# Patient Record
Sex: FEMALE | Race: ASIAN
[De-identification: names, ages, dates, MRNs, and addresses within clinical notes are randomized per-mention and may not be internally consistent; named-entity substitution may affect disease eponyms.]

---

## 2019-08-04 ENCOUNTER — HOSPITAL ENCOUNTER (INPATIENT)
Dept: HOSPITAL 72 - EMR | Age: 49
LOS: 3 days | Discharge: HOME | DRG: 192 | End: 2019-08-07
Payer: SELF-PAY

## 2019-08-04 VITALS — DIASTOLIC BLOOD PRESSURE: 72 MMHG | SYSTOLIC BLOOD PRESSURE: 126 MMHG

## 2019-08-04 VITALS
DIASTOLIC BLOOD PRESSURE: 97 MMHG | HEIGHT: 65 IN | WEIGHT: 140.06 LBS | BODY MASS INDEX: 23.34 KG/M2 | SYSTOLIC BLOOD PRESSURE: 160 MMHG

## 2019-08-04 VITALS — SYSTOLIC BLOOD PRESSURE: 138 MMHG | DIASTOLIC BLOOD PRESSURE: 86 MMHG

## 2019-08-04 VITALS — DIASTOLIC BLOOD PRESSURE: 79 MMHG | SYSTOLIC BLOOD PRESSURE: 138 MMHG

## 2019-08-04 VITALS — DIASTOLIC BLOOD PRESSURE: 76 MMHG | SYSTOLIC BLOOD PRESSURE: 115 MMHG

## 2019-08-04 VITALS — SYSTOLIC BLOOD PRESSURE: 120 MMHG | DIASTOLIC BLOOD PRESSURE: 78 MMHG

## 2019-08-04 DIAGNOSIS — R06.03: ICD-10-CM

## 2019-08-04 DIAGNOSIS — E11.9: ICD-10-CM

## 2019-08-04 DIAGNOSIS — J44.1: Primary | ICD-10-CM

## 2019-08-04 DIAGNOSIS — F17.200: ICD-10-CM

## 2019-08-04 LAB
ADD MANUAL DIFF: NO
ALBUMIN SERPL-MCNC: 4 G/DL (ref 3.4–5)
ALBUMIN/GLOB SERPL: 1.1 {RATIO} (ref 1–2.7)
ALP SERPL-CCNC: 80 U/L (ref 46–116)
ALT SERPL-CCNC: 23 U/L (ref 12–78)
ANION GAP SERPL CALC-SCNC: 12 MMOL/L (ref 5–15)
APPEARANCE UR: CLEAR
APTT PPP: (no result) S
AST SERPL-CCNC: 22 U/L (ref 15–37)
BASOPHILS NFR BLD AUTO: 0.3 % (ref 0–2)
BILIRUB SERPL-MCNC: 0.5 MG/DL (ref 0.2–1)
BUN SERPL-MCNC: 9 MG/DL (ref 7–18)
CALCIUM SERPL-MCNC: 8.4 MG/DL (ref 8.5–10.1)
CHLORIDE SERPL-SCNC: 104 MMOL/L (ref 98–107)
CO2 SERPL-SCNC: 23 MMOL/L (ref 21–32)
CREAT SERPL-MCNC: 0.5 MG/DL (ref 0.55–1.3)
EOSINOPHIL NFR BLD AUTO: 1.3 % (ref 0–3)
ERYTHROCYTE [DISTWIDTH] IN BLOOD BY AUTOMATED COUNT: 11.3 % (ref 11.6–14.8)
GLOBULIN SER-MCNC: 3.7 G/DL
GLUCOSE UR STRIP-MCNC: (no result) MG/DL
HCT VFR BLD CALC: 46.1 % (ref 37–47)
HGB BLD-MCNC: 15.7 G/DL (ref 12–16)
KETONES UR QL STRIP: (no result)
LEUKOCYTE ESTERASE UR QL STRIP: NEGATIVE
LYMPHOCYTES NFR BLD AUTO: 12.4 % (ref 20–45)
MCV RBC AUTO: 89 FL (ref 80–99)
MONOCYTES NFR BLD AUTO: 4.3 % (ref 1–10)
NEUTROPHILS NFR BLD AUTO: 81.5 % (ref 45–75)
NITRITE UR QL STRIP: NEGATIVE
PH UR STRIP: 6 [PH] (ref 4.5–8)
PLATELET # BLD: 277 K/UL (ref 150–450)
POTASSIUM SERPL-SCNC: 3.6 MMOL/L (ref 3.5–5.1)
PROT UR QL STRIP: (no result)
RBC # BLD AUTO: 5.17 M/UL (ref 4.2–5.4)
SODIUM SERPL-SCNC: 139 MMOL/L (ref 136–145)
SP GR UR STRIP: 1.02 (ref 1–1.03)
UROBILINOGEN UR-MCNC: NORMAL MG/DL (ref 0–1)
WBC # BLD AUTO: 15.2 K/UL (ref 4.8–10.8)

## 2019-08-04 PROCEDURE — 96375 TX/PRO/DX INJ NEW DRUG ADDON: CPT

## 2019-08-04 PROCEDURE — 87040 BLOOD CULTURE FOR BACTERIA: CPT

## 2019-08-04 PROCEDURE — 36600 WITHDRAWAL OF ARTERIAL BLOOD: CPT

## 2019-08-04 PROCEDURE — 5A09357 ASSISTANCE WITH RESPIRATORY VENTILATION, LESS THAN 24 CONSECUTIVE HOURS, CONTINUOUS POSITIVE AIRWAY PRESSURE: ICD-10-PCS

## 2019-08-04 PROCEDURE — 94640 AIRWAY INHALATION TREATMENT: CPT

## 2019-08-04 PROCEDURE — 84484 ASSAY OF TROPONIN QUANT: CPT

## 2019-08-04 PROCEDURE — 82962 GLUCOSE BLOOD TEST: CPT

## 2019-08-04 PROCEDURE — 85651 RBC SED RATE NONAUTOMATED: CPT

## 2019-08-04 PROCEDURE — 80053 COMPREHEN METABOLIC PANEL: CPT

## 2019-08-04 PROCEDURE — 80048 BASIC METABOLIC PNL TOTAL CA: CPT

## 2019-08-04 PROCEDURE — 85007 BL SMEAR W/DIFF WBC COUNT: CPT

## 2019-08-04 PROCEDURE — 97803 MED NUTRITION INDIV SUBSEQ: CPT

## 2019-08-04 PROCEDURE — 83735 ASSAY OF MAGNESIUM: CPT

## 2019-08-04 PROCEDURE — 85025 COMPLETE CBC W/AUTO DIFF WBC: CPT

## 2019-08-04 PROCEDURE — 96368 THER/DIAG CONCURRENT INF: CPT

## 2019-08-04 PROCEDURE — 93005 ELECTROCARDIOGRAM TRACING: CPT

## 2019-08-04 PROCEDURE — 96365 THER/PROPH/DIAG IV INF INIT: CPT

## 2019-08-04 PROCEDURE — 71045 X-RAY EXAM CHEST 1 VIEW: CPT

## 2019-08-04 PROCEDURE — 99291 CRITICAL CARE FIRST HOUR: CPT

## 2019-08-04 PROCEDURE — 86140 C-REACTIVE PROTEIN: CPT

## 2019-08-04 PROCEDURE — 81003 URINALYSIS AUTO W/O SCOPE: CPT

## 2019-08-04 PROCEDURE — 36415 COLL VENOUS BLD VENIPUNCTURE: CPT

## 2019-08-04 PROCEDURE — 82803 BLOOD GASES ANY COMBINATION: CPT

## 2019-08-04 PROCEDURE — 94664 DEMO&/EVAL PT USE INHALER: CPT

## 2019-08-04 PROCEDURE — 84100 ASSAY OF PHOSPHORUS: CPT

## 2019-08-04 PROCEDURE — 83880 ASSAY OF NATRIURETIC PEPTIDE: CPT

## 2019-08-04 PROCEDURE — 94660 CPAP INITIATION&MGMT: CPT

## 2019-08-04 RX ADMIN — IPRATROPIUM BROMIDE AND ALBUTEROL SULFATE SCH ML: .5; 3 SOLUTION RESPIRATORY (INHALATION) at 19:11

## 2019-08-04 RX ADMIN — Medication SCH MCG: at 06:01

## 2019-08-04 RX ADMIN — ALBUTEROL SULFATE SCH MG: 2.5 SOLUTION RESPIRATORY (INHALATION) at 06:50

## 2019-08-04 RX ADMIN — ALBUTEROL SULFATE SCH MG: 2.5 SOLUTION RESPIRATORY (INHALATION) at 05:53

## 2019-08-04 RX ADMIN — METHYLPREDNISOLONE SODIUM SUCCINATE SCH MG: 125 INJECTION, POWDER, FOR SOLUTION INTRAMUSCULAR; INTRAVENOUS at 18:09

## 2019-08-04 RX ADMIN — IPRATROPIUM BROMIDE AND ALBUTEROL SULFATE SCH ML: .5; 3 SOLUTION RESPIRATORY (INHALATION) at 10:42

## 2019-08-04 RX ADMIN — Medication SCH MCG: at 05:53

## 2019-08-04 RX ADMIN — Medication SCH MCG: at 06:49

## 2019-08-04 RX ADMIN — IPRATROPIUM BROMIDE AND ALBUTEROL SULFATE SCH ML: .5; 3 SOLUTION RESPIRATORY (INHALATION) at 22:56

## 2019-08-04 RX ADMIN — ALBUTEROL SULFATE SCH MG: 2.5 SOLUTION RESPIRATORY (INHALATION) at 06:21

## 2019-08-04 RX ADMIN — ALBUTEROL SULFATE SCH MG: 2.5 SOLUTION RESPIRATORY (INHALATION) at 06:48

## 2019-08-04 RX ADMIN — ALBUTEROL SULFATE SCH MG: 2.5 SOLUTION RESPIRATORY (INHALATION) at 06:01

## 2019-08-04 RX ADMIN — ALBUTEROL SULFATE SCH MG: 2.5 SOLUTION RESPIRATORY (INHALATION) at 06:49

## 2019-08-04 RX ADMIN — Medication SCH MCG: at 06:50

## 2019-08-04 RX ADMIN — Medication SCH MCG: at 06:21

## 2019-08-04 RX ADMIN — IPRATROPIUM BROMIDE AND ALBUTEROL SULFATE SCH ML: .5; 3 SOLUTION RESPIRATORY (INHALATION) at 15:38

## 2019-08-04 NOTE — NUR
CASE MANAGEMENT: REVIEW



49Y/F PRESENTED TO ED FROM HOME 



CC: RESP DISTRESS 





SI: COPD EXACERBATION 

T 97.7  RR 30 /97 % BIPAP FIO2 100

WBC 15.2 

ABG: PH 7.267 PO2 <45.3 HCO3 19.6 O2 SAT 60.1 





IS: NS IVF BOLUS X1

ATROVENT HHN X1

ALBUTEROL HHN X1

SOLU MEDROL IV X1

AZITHROMYCIN IV X1

CEFTRIAXONE IV X1 





***PATIENT ADMITTED TO STEP DOWN UNIT 08/04/2019***

DCP: PATIENT IS FROM HOME

## 2019-08-04 NOTE — DIAGNOSTIC IMAGING REPORT
EXAM:

  XR Chest, 1 View

 

CLINICAL HISTORY:

  SOB

 

TECHNIQUE:

  Frontal view of the chest.

 

COMPARISON:

  No relevant prior studies available.

 

FINDINGS:

  Lungs:  Unremarkable.  No consolidation.

  Pleural space:  Unremarkable.  No pneumothorax.

  Heart:  Unremarkable.  No cardiomegaly.

  Mediastinum:  Unremarkable.

  Bones/joints:  Unremarkable.

  Upper abdomen:  Gassy stomach or bowel in the upper abdomen.

 

IMPRESSION:     

1.  No acute cardiothoracic process.

2.  Gassy stomach or bowel in the upper abdomen.

## 2019-08-04 NOTE — EMERGENCY ROOM REPORT
History of Present Illness


General


Chief Complaint:  Dyspnea/Respdistress


Source:  Patient, EMS





Present Illness


HPI


49-year-old female history of asthma history limited secondary to patient's 

medical condition, collateral history provided by EMS, patient presents with 

acute shortness of breath that started just prior to arrival she has a history 

of asthma, unknown aggravating or alleviating factors, patient takes albuterol 

at home, unknown if she was using at home, severity is severe,


Allergies:  


Coded Allergies:  


     No Known Allergies (Unverified , 8/4/19)





Patient History


Past Medical History:  see triage record


Reviewed Nursing Documentation:  PMH: Agreed; PSxH: Agreed





Nursing Documentation-PMH


Past Medical History:  No History, Except For


Hx Asthma:  Yes





Review of Systems


All Other Systems:  limited - acute shortness of breath





Physical Exam





Vital Signs








  Date Time  Temp Pulse Resp B/P (MAP) Pulse Ox O2 Delivery O2 Flow Rate FiO2


 


8/4/19 05:28 97.7 97 30 160/72 (101) 98 Room Air  








Sp02 EP Interpretation:  reviewed, normal


General Appearance:  alert, moderate distress


Head:  normocephalic, atraumatic


Eyes:  bilateral eye PERRL, bilateral eye EOMI


ENT:  uvula midline, moist mucus membranes


Neck:  supple, thyroid normal, supple/symm/no masses


Respiratory:  respiratory distress, accessory muscle use, wheezing - severe


Cardiovascular #1:  normal peripheral pulses, regular rate, rhythm, no edema, 

no gallop, no murmur


Gastrointestinal:  non tender, soft, no guarding, no rebound


Musculoskeletal:  normal inspection


Neurologic:  alert, oriented x3


Psychiatric:  mood/affect normal


Skin:  no rash, warm/dry





Procedures


Critical Care Time


Critical Care Time


Given the critical condition in which the patient arrived, the patient was 

immediately assessed by myself and the nurse, and cardiac monitoring initiated 

due to the potential for rapid decompensation of the patient's clinical 

condition. During the course of the patient's stay, I spent a considerable 

amount of time at the bedside performing serial re-evaluations of the patient's 

hemodynamic and clinical status because of the recognized potential threat to 

life or limb in this condition. I then had a chance to review not only all of 

the available current laboratory and radiographic studies obtained today, but I 

also reviewed old records available to me at the time. Additionally, any 

ancillary information available including paramedic records were reviewed. 

Sequential vital signs were obtained. 





Critical Care time of 32 minutes was performed exclusive of billable procedures.





Patient with acute respiratory distress.





Medical Decision Making


Diagnostic Impression:  


 Primary Impression:  


 Respiratory distress


 Additional Impression:  


 COPD with exacerbation


ER Course


49-year-old female history of COPD, history of smoking presents with acute 

shortness of breath, patient unable to speak full sentences using accessory 

muscles,


BiPAP immediately started for acute respiratory distress


Duo nebs, steroids, fluids


Ceftriaxone azithromycin started for possible pneumonia given sputum production 

over the past day


Reevaluation 6:13 AM, patient slowly improving with BiPAP, will reassess and ABG


Reeval 6:35 AM, patient more comfortable


Patient admitted to Dr. Carey





Laboratory Tests








Test


  8/4/19


05:25 8/4/19


06:24 8/4/19


06:40


 


White Blood Count


  15.2 K/UL


(4.8-10.8)  H 


  


 


 


Red Blood Count


  5.17 M/UL


(4.20-5.40) 


  


 


 


Hemoglobin


  15.7 G/DL


(12.0-16.0) 


  


 


 


Hematocrit


  46.1 %


(37.0-47.0) 


  


 


 


Mean Corpuscular Volume 89 FL (80-99)    


 


Mean Corpuscular Hemoglobin


  30.3 PG


(27.0-31.0) 


  


 


 


Mean Corpuscular Hemoglobin


Concent 34.0 G/DL


(32.0-36.0) 


  


 


 


Red Cell Distribution Width


  11.3 %


(11.6-14.8)  L 


  


 


 


Platelet Count


  277 K/UL


(150-450) 


  


 


 


Mean Platelet Volume


  6.3 FL


(6.5-10.1)  L 


  


 


 


Neutrophils (%) (Auto)


  81.5 %


(45.0-75.0)  H 


  


 


 


Lymphocytes (%) (Auto)


  12.4 %


(20.0-45.0)  L 


  


 


 


Monocytes (%) (Auto)


  4.3 %


(1.0-10.0) 


  


 


 


Eosinophils (%) (Auto)


  1.3 %


(0.0-3.0) 


  


 


 


Basophils (%) (Auto)


  0.3 %


(0.0-2.0) 


  


 


 


Sodium Level


  139 MMOL/L


(136-145) 


  


 


 


Potassium Level


  3.6 MMOL/L


(3.5-5.1) 


  


 


 


Chloride Level


  104 MMOL/L


() 


  


 


 


Carbon Dioxide Level


  23 MMOL/L


(21-32) 


  


 


 


Anion Gap


  12 mmol/L


(5-15) 


  


 


 


Blood Urea Nitrogen


  9 mg/dL (7-18)


  


  


 


 


Creatinine


  0.5 MG/DL


(0.55-1.30)  L 


  


 


 


Estimate Glomerular


Filtration Rate > 60 mL/min


(>60) 


  


 


 


Glucose Level


  147 MG/DL


()  H 


  


 


 


Calcium Level


  8.4 MG/DL


(8.5-10.1)  L 


  


 


 


Total Bilirubin


  0.5 MG/DL


(0.2-1.0) 


  


 


 


Aspartate Amino Transferase


(AST) 22 U/L (15-37)


  


  


 


 


Alanine Aminotransferase (ALT)


  23 U/L (12-78)


  


  


 


 


Alkaline Phosphatase


  80 U/L


() 


  


 


 


Troponin I


  0.000 ng/mL


(0.000-0.056) 


  


 


 


Pro-B-Type Natriuretic Peptide


  72 pg/mL


(0-125) 


  


 


 


Total Protein


  7.7 G/DL


(6.4-8.2) 


  


 


 


Albumin


  4.0 G/DL


(3.4-5.0) 


  


 


 


Globulin 3.7 g/dL    


 


Albumin/Globulin Ratio 1.1 (1.0-2.7)    


 


Arterial Blood pH


  


  7.267


(7.350-7.450) 


 


 


Arterial Blood Partial


Pressure CO2 


  44.0 mmHg


(35.0-45.0) 


 


 


Arterial Blood Partial


Pressure O2 


  < 45.3 mmHg


(75.0-100.0) 


 


 


Arterial Blood HCO3


  


  19.6 mmol/L


(22.0-26.0)  L 


 


 


Arterial Blood Oxygen


Saturation 


  60.1 %


()  *L 


 


 


Arterial Blood Base Excess  -7.1 (-2-2)  L 


 


Mason Test  Positive   


 


Urine Color   Pale yellow  


 


Urine Appearance   Clear  


 


Urine pH   6 (4.5-8.0)  


 


Urine Specific Gravity


  


  


  1.020


(1.005-1.035)


 


Urine Protein


  


  


  4+ (NEGATIVE)


H


 


Urine Glucose (UA)


  


  


  1+ (NEGATIVE)


H


 


Urine Ketones


  


  


  3+ (NEGATIVE)


H


 


Urine Blood


  


  


  4+ (NEGATIVE)


H


 


Urine Nitrite


  


  


  Negative


(NEGATIVE)


 


Urine Bilirubin


  


  


  Negative


(NEGATIVE)


 


Urine Urobilinogen


  


  


  Normal MG/DL


(0.0-1.0)


 


Urine Leukocyte Esterase


  


  


  Negative


(NEGATIVE)


 


Urine RBC


  


  


  10-15 /HPF (0


- 2)  H


 


Urine WBC


  


  


  0-2 /HPF (0 -


2)


 


Urine Squamous Epithelial


Cells 


  


  Moderate /LPF


(NONE/OCC)  H


 


Urine Bacteria


  


  


  Few /HPF


(NONE)








EKG Diagnostic Results


EKG Time:  05:50


EP Interpretation:  NSR rate 86 normal axis, no acute st elevations, premature 

atrial complex


Rate:  normal


Rhythm:  NSR


ST Segments:  no acute changes


ASA given to the pt in ED:  No





Rhythm Strip Diag. Results


Rhythm Strip Time:  05:55


EP Interpretation:  yes


Rate:  84


Rhythm:  NSR, other - premature atrial complexes noted





Chest X-Ray Diagnostic Results


Chest X-Ray Diagnostic Results :  


   Chest X-Ray Ordered:  Yes


   # of Views/Limited/Complete:  1 View


   Indication:  Shortness of Breath


   EP Interpretation:  Yes


   Interpretation:  no consolidation, no effusion, no pneumothorax, no acute 

cardiopulmonary disease


   Impression:  No acute disease


   Electronically Signed by:  Pedro Luis Valencia MD





Last Vital Signs








  Date Time  Temp Pulse Resp B/P (MAP) Pulse Ox O2 Delivery O2 Flow Rate FiO2


 


8/4/19 05:28 97.7 97 30 160/72 (101) 98 Room Air  








Disposition:  ADMITTED AS INPATIENT


Condition:  Stable











Pedro Luis Valencia MD Aug 4, 2019 05:42

## 2019-08-04 NOTE — NUR
ED Nurse Note:





report given to Kathia ROMERO, endorsed all plan of care to Kathia ROMERO.

patient is being transferred with all of her belongings in stable condition,

## 2019-08-04 NOTE — NUR
NURSE NOTES:

Received patient from Mary Ellen ROMERO. Patient is alert and oriented X4 with no signs of distress. 
Pt is well groomed, and clean. Pt in bed and resting with IV patent and intact. Bed at its 
lowest position, call light in reach and X 3 bed rails are up.

## 2019-08-04 NOTE — CONSULTATION
History of Present Illness


General


Date patient seen:  Aug 4, 2019


Chief Complaint:  Dyspnea/Respdistress





Present Illness


HPI


48 y/o F with hx of asthma/COPD, tobacco abuse, diabetes presented to ED on 8/4 

with acute SOB.


Allergies:  


Coded Allergies:  


     No Known Allergies (Unverified , 8/4/19)





Medication History


Miscellaneous Medications


Unable to Obtain Medications (Unable To Obtain Meds), (Reported)





Patient History


Healthcare decision maker


self


Resuscitation status


Full Code


Advanced Directive on File





Patient History Narrative





Pmhx: as above





Shx:   Positive smoke.  No alcohol.  No intravenous drug abuse.


 


Fhx: non contributory





Review of Systems


All Other Systems:  negative except mentioned in HPI





Physical Exam


Physical Exam Narrative








GENERAL:  Sleepy in bed, not responding to questions.


CARDIOVASCULAR:  No murmur.


LUNGS:  Poor air exchange.


ABDOMEN:  Bowel sounds distant.


EXTREMITIES:  No cyanosis or edema.


NEUROLOGIC:  The patient is flaccid in bed, does not want to follow


directions.





Last 24 Hour Vital Signs








  Date Time  Temp Pulse Resp B/P (MAP) Pulse Ox O2 Delivery O2 Flow Rate FiO2


 


8/4/19 16:00      Nasal Cannula 2.0 


 


8/4/19 16:00 97.9 88 23 115/76 (89) 98   


 


8/4/19 15:30  88      


 


8/4/19 15:29  82 20  99 Nasal Cannula 2.0 28


 


8/4/19 15:15  78 18  98 Nasal Cannula 2.0 28


 


8/4/19 12:00 97.9 87 20 138/79 (98) 97   


 


8/4/19 12:00      Nasal Cannula 2.0 


 


8/4/19 11:47  82      


 


8/4/19 11:11      Bi-pap  


 


8/4/19 11:01       2.0 


 


8/4/19 11:00        21


 


8/4/19 10:49  77 20  100 Bi-Pap  21


 


8/4/19 10:42  80 20  100 Facial  21


 


8/4/19 10:40  80 20  100 Bi-Pap  21


 


8/4/19 09:20  80 23  100 Facial  25


 


8/4/19 09:00 97.6 79 25 126/72 (90) 99   


 


8/4/19 09:00      Bi-pap  


 


8/4/19 08:38  81      


 


8/4/19 08:30 97.7 82 17 120/78 100 Bi-pap  25


 


8/4/19 07:20 97.7 82 17 120/78 100 Bi-pap  


 


8/4/19 06:52  88 22  100 Bi-Pap  25


 


8/4/19 06:50  88 22  100 Facial  25


 


8/4/19 06:21  81 19  99 Bi-Pap  25


 


8/4/19 06:01  88 24  99 Bi-Pap  40


 


8/4/19 05:56  86 25  100 Facial  40


 


8/4/19 05:55  96 28  99 Bi-Pap  40


 


8/4/19 05:54  96 28  96 Bi-Pap  40


 


8/4/19 05:30  105 30   Bi-pap  100


 


8/4/19 05:30 97.7 105 30 160/97 100 Room Air  


 


8/4/19 05:28 97.7 97 30 160/72 (101) 98 Room Air  

















Intake and Output  


 


 8/3/19 8/4/19





 19:00 07:00


 


Intake Total  830 ml


 


Output Total  0 ml


 


Balance  830 ml


 


  


 


Intake IV Total  830 ml


 


Output Urine Total  0 ml











Laboratory Tests








Test


  8/4/19


05:25 8/4/19


06:24 8/4/19


06:40 8/4/19


12:39


 


White Blood Count


  15.2 K/UL


(4.8-10.8)  H 


  


  


 


 


Red Blood Count


  5.17 M/UL


(4.20-5.40) 


  


  


 


 


Hemoglobin


  15.7 G/DL


(12.0-16.0) 


  


  


 


 


Hematocrit


  46.1 %


(37.0-47.0) 


  


  


 


 


Mean Corpuscular Volume 89 FL (80-99)     


 


Mean Corpuscular Hemoglobin


  30.3 PG


(27.0-31.0) 


  


  


 


 


Mean Corpuscular Hemoglobin


Concent 34.0 G/DL


(32.0-36.0) 


  


  


 


 


Red Cell Distribution Width


  11.3 %


(11.6-14.8)  L 


  


  


 


 


Platelet Count


  277 K/UL


(150-450) 


  


  


 


 


Mean Platelet Volume


  6.3 FL


(6.5-10.1)  L 


  


  


 


 


Neutrophils (%) (Auto)


  81.5 %


(45.0-75.0)  H 


  


  


 


 


Lymphocytes (%) (Auto)


  12.4 %


(20.0-45.0)  L 


  


  


 


 


Monocytes (%) (Auto)


  4.3 %


(1.0-10.0) 


  


  


 


 


Eosinophils (%) (Auto)


  1.3 %


(0.0-3.0) 


  


  


 


 


Basophils (%) (Auto)


  0.3 %


(0.0-2.0) 


  


  


 


 


Sodium Level


  139 MMOL/L


(136-145) 


  


  


 


 


Potassium Level


  3.6 MMOL/L


(3.5-5.1) 


  


  


 


 


Chloride Level


  104 MMOL/L


() 


  


  


 


 


Carbon Dioxide Level


  23 MMOL/L


(21-32) 


  


  


 


 


Anion Gap


  12 mmol/L


(5-15) 


  


  


 


 


Blood Urea Nitrogen


  9 mg/dL (7-18)


  


  


  


 


 


Creatinine


  0.5 MG/DL


(0.55-1.30)  L 


  


  


 


 


Estimat Glomerular Filtration


Rate > 60 mL/min


(>60) 


  


  


 


 


Glucose Level


  147 MG/DL


()  H 


  


  


 


 


Calcium Level


  8.4 MG/DL


(8.5-10.1)  L 


  


  


 


 


Total Bilirubin


  0.5 MG/DL


(0.2-1.0) 


  


  


 


 


Aspartate Amino Transf


(AST/SGOT) 22 U/L (15-37)


  


  


  


 


 


Alanine Aminotransferase


(ALT/SGPT) 23 U/L (12-78)


  


  


  


 


 


Alkaline Phosphatase


  80 U/L


() 


  


  


 


 


Troponin I


  0.000 ng/mL


(0.000-0.056) 


  


  


 


 


Pro-B-Type Natriuretic Peptide


  72 pg/mL


(0-125) 


  


  


 


 


Total Protein


  7.7 G/DL


(6.4-8.2) 


  


  


 


 


Albumin


  4.0 G/DL


(3.4-5.0) 


  


  


 


 


Globulin 3.7 g/dL     


 


Albumin/Globulin Ratio 1.1 (1.0-2.7)     


 


Arterial Blood pH


  


  7.267


(7.350-7.450) 


  7.342


(7.350-7.450)


 


Arterial Blood Partial


Pressure CO2 


  44.0 mmHg


(35.0-45.0) 


  39.1 mmHg


(35.0-45.0)


 


Arterial Blood Partial


Pressure O2 


  < 45.3 mmHg


(75.0-100.0) 


  47.1 mmHg


(75.0-100.0)


 


Arterial Blood HCO3


  


  19.6 mmol/L


(22.0-26.0)  L 


  20.8 mmol/L


(22.0-26.0)  L


 


Arterial Blood Oxygen


Saturation 


  60.1 %


()  *L 


  83.2 %


()  *L


 


Arterial Blood Base Excess  -7.1 (-2-2)  L  -4.5 (-2-2)  L


 


Mason Test  Positive    Positive  


 


Urine Color   Pale yellow   


 


Urine Appearance   Clear   


 


Urine pH   6 (4.5-8.0)   


 


Urine Specific Gravity


  


  


  1.020


(1.005-1.035) 


 


 


Urine Protein


  


  


  4+ (NEGATIVE)


H 


 


 


Urine Glucose (UA)


  


  


  1+ (NEGATIVE)


H 


 


 


Urine Ketones


  


  


  3+ (NEGATIVE)


H 


 


 


Urine Blood


  


  


  4+ (NEGATIVE)


H 


 


 


Urine Nitrite


  


  


  Negative


(NEGATIVE) 


 


 


Urine Bilirubin


  


  


  Negative


(NEGATIVE) 


 


 


Urine Urobilinogen


  


  


  Normal MG/DL


(0.0-1.0) 


 


 


Urine Leukocyte Esterase


  


  


  Negative


(NEGATIVE) 


 


 


Urine RBC


  


  


  10-15 /HPF (0


- 2)  H 


 


 


Urine WBC


  


  


  0-2 /HPF (0 -


2) 


 


 


Urine Squamous Epithelial


Cells 


  


  Moderate /LPF


(NONE/OCC)  H 


 


 


Urine Bacteria


  


  


  Few /HPF


(NONE) 


 








Height (Feet):  5


Height (Inches):  5.00


Weight (Pounds):  125


Medications





Current Medications








 Medications


  (Trade)  Dose


 Ordered  Sig/Alistair


 Route


 PRN Reason  Start Time


 Stop Time Status Last Admin


Dose Admin


 


 Albuterol/


 Ipratropium


  (Albuterol/


 Ipratropium)  3 ml  Q4H  PRN


 HHN


 Shortness of Breath  8/4/19 17:30


 8/9/19 17:29   


 


 


 Albuterol/


 Ipratropium


  (Albuterol/


 Ipratropium)  3 ml  Q4HRT


 HHN


   8/4/19 11:00


 8/9/19 10:59  8/4/19 15:38


 


 


 Dextrose


  (Dextrose 50%)    STAT  PRN


 IV


 Hypoglycemia  8/4/19 17:30


 9/3/19 17:29   


 


 


 Dextrose/Sodium


 Chloride  1,000 ml @ 


 50 mls/hr  Q20H


 IV


   8/4/19 18:00


 9/3/19 17:59   


 


 


 Insulin Aspart


  (NovoLOG)    BEFORE MEALS AND  HS


 SUBQ


   8/4/19 21:00


 9/3/19 20:59   


 


 


 Lorazepam


  (Ativan 2mg/ml


 1ml)  2 mg  Q4H  PRN


 IV


 For Anxiety  8/4/19 17:30


 8/11/19 17:29   


 


 


 Methylprednisolone


 Sodium Succinate


  (Solu-MEDROL)  60 mg  EVERY 6  HOURS


 IV


   8/4/19 18:00


 9/3/19 17:59   


 


 


 Morphine Sulfate


  (Morphine


 Sulfate)  2 mg  Q4H  PRN


 IVP


 Moderate Pain (Pain Scale 4-6)  8/4/19 17:45


 8/11/19 17:29   


 


 


 Morphine Sulfate


  (Morphine


 Sulfate)  4 mg  Q4H  PRN


 IVP


 Severe Pain (Pain Scale 7-10)  8/4/19 17:30


 8/11/19 17:29   


 


 


 Nitroglycerin


  (Ntg)  0.4 mg  Q5M X 3 DOSES PRN


 SL


 Prn Chest Pain  8/4/19 17:30


 9/3/19 17:29   


 


 


 Ondansetron HCl


  (Zofran)  4 mg  Q6H  PRN


 IVP


 Nausea & Vomiting  8/4/19 17:30


 9/3/19 17:29   


 


 


 Pantoprazole


  (Protonix)  40 mg  DAILY


 IV


   8/5/19 09:00


 9/4/19 08:59   


 


 


 Piperacillin Sod/


 Tazobactam Sod


 3.375 gm/Dextrose  110 ml @ 


 27.5 mls/hr  EVERY 8  HOURS


 IVPB


   8/4/19 22:00


 8/11/19 21:59   


 











Assessment/Plan


Assessment/Plan:





Abx:


Ceftriaxone x1 8/4


Azithromycin x1 8/4





Assessment:


COPD exacerbation


  -CXR: 1.  No acute cardiothoracic process. Gassy stomach or bowel in the 

upper abdomen.


 


Afebrile


Mild leukocytosis


  -u/a no pyuria, +hematuria





asthma/COPD


tobacco abuse


diabetes





Plan:


-Start Levaquin for COPD exacerbation


-f/u cx


-Monitor CBC/BMP, temperatures


-respiratory supportive treatment


-aspiration precautions





Thank you for this consultation. Will continue to follow along with you.





Discussed with Leida Cha M.D. Aug 4, 2019 17:50

## 2019-08-04 NOTE — NUR
NURSE NOTES:

Received bedside report from NICK Bae.Patient stable,in a bed,A&O x4,no c/o pain,no 
respiratory distress noted at this moment,tolerated N/C with 2 L/min well,BS active in all 
quadrants,IV asymptomatic,intact on R AC G 20 running with D 5 1/2 NS @ 50 ml/hr and L wrist 
G 20 SL, bed secured in a low safety position,call light within a reach.Will continue to 
monitor and follow POC.

## 2019-08-04 NOTE — NUR
ED Nurse Note:



pt mary ra 26 per ems pt was on her driveway showing signs of difficulty 
breathing. on route pt was given albuterol. ermd at bedside, rt at bedside. pt 
placed on bipap. pt placed on cardiac monitor, placed in gown, bed at lowest 
position x 2 siderails

## 2019-08-04 NOTE — NUR
NURSE NOTES:



Placed patient on nasal cannula at 2LPM. Will monitor her oxygen level. Current oxygen 
saturation is 98%.

## 2019-08-04 NOTE — NUR
NURSE NOTES:



Received patient fro ED. Transferred to cardiac monitor bed with 1 staff assist, patient is 
able to move with minimal assistance. Patient is currently on bipap. Call light within 
reach. Denies any pain at this time. Belonging list checked with ED nurse. Cellphone at 
bedside, with no . Patient is alert, able to make needs known. Patient stated that 
she's using her brother's albuterol inhaler at home. Will admit to SDU standard level of 
care. Skin is intact. Oxygen saturation noted at 98%.

## 2019-08-04 NOTE — HISTORY AND PHYSICAL REPORT
DATE OF ADMISSION:  08/04/2019

TIME SEEN:  8 a.m.



CONSULTANT:  Star Monte M.D.



CHIEF COMPLAINT:  Shortness of breath, COPD exacerbation.



BRIEF HISTORY:  This is a 49-year-old female, who lives at home with

several days of increased shortness of breath, came to [facility] this

morning with COPD exacerbation diagnosis, and admitted to step-down unit.

Currently sleepy, O2 mask in place, not talking much.



REVIEW OF SYSTEMS:  Unavailable.



PAST MEDICAL HISTORY:  COPD and asthma.



PAST SURGICAL HISTORY:  None.



ALLERGIES:  Denies.



MEDICATIONS:  Include ceftriaxone, azithromycin, albuterol and ipratropium,

and methylprednisolone.



SOCIAL HISTORY:  Positive smoke.  No alcohol.  No intravenous drug abuse.



FAMILY HISTORY:  Noncontributory.



PHYSICAL EXAMINATION:

GENERAL:  Sleepy in bed, not responding to questions.

VITAL SIGNS:  Temperature is 97 degrees, pulse 82, respirations 17, and

blood pressure 120/78.

CARDIOVASCULAR:  No murmur.

LUNGS:  Poor air exchange.

ABDOMEN:  Bowel sounds distant.

EXTREMITIES:  No cyanosis or edema.

NEUROLOGIC:  The patient is flaccid in bed, does not want to follow

directions.



LABORATORY AND DIAGNOSTIC DATA:  White count 15, otherwise CBC is normal.

BMP show creatinine 0.5, glucose 147.  Urinalysis show 3+ ketones, 4+

protein.



ASSESSMENT:

1. COPD exacerbation.

2. Leukocytosis.

3. Diabetes.

4. Asthma.



PLAN:

1. O2 and pulmonary treatment.

2. Antibiotics per Infectious Disease.

3. Blood pressure and blood sugar control.

4. Dietary followup.

5. Taper off steroids.









  ______________________________________________

  Casper Carey D.O.





DR:  CARLOS

D:  08/04/2019 08:59

T:  08/04/2019 17:07

JOB#:  [id number]

CC:

## 2019-08-05 VITALS — SYSTOLIC BLOOD PRESSURE: 117 MMHG | DIASTOLIC BLOOD PRESSURE: 67 MMHG

## 2019-08-05 VITALS — DIASTOLIC BLOOD PRESSURE: 74 MMHG | SYSTOLIC BLOOD PRESSURE: 113 MMHG

## 2019-08-05 VITALS — SYSTOLIC BLOOD PRESSURE: 117 MMHG | DIASTOLIC BLOOD PRESSURE: 73 MMHG

## 2019-08-05 VITALS — SYSTOLIC BLOOD PRESSURE: 128 MMHG | DIASTOLIC BLOOD PRESSURE: 74 MMHG

## 2019-08-05 VITALS — DIASTOLIC BLOOD PRESSURE: 74 MMHG | SYSTOLIC BLOOD PRESSURE: 140 MMHG

## 2019-08-05 VITALS — DIASTOLIC BLOOD PRESSURE: 68 MMHG | SYSTOLIC BLOOD PRESSURE: 121 MMHG

## 2019-08-05 LAB
ADD MANUAL DIFF: YES
ALBUMIN SERPL-MCNC: 3.4 G/DL (ref 3.4–5)
ALBUMIN/GLOB SERPL: 0.9 {RATIO} (ref 1–2.7)
ALP SERPL-CCNC: 68 U/L (ref 46–116)
ALT SERPL-CCNC: 14 U/L (ref 12–78)
ANION GAP SERPL CALC-SCNC: 11 MMOL/L (ref 5–15)
AST SERPL-CCNC: 13 U/L (ref 15–37)
BILIRUB SERPL-MCNC: 0.3 MG/DL (ref 0.2–1)
BUN SERPL-MCNC: 12 MG/DL (ref 7–18)
CALCIUM SERPL-MCNC: 8.7 MG/DL (ref 8.5–10.1)
CHLORIDE SERPL-SCNC: 104 MMOL/L (ref 98–107)
CO2 SERPL-SCNC: 24 MMOL/L (ref 21–32)
CREAT SERPL-MCNC: 0.6 MG/DL (ref 0.55–1.3)
ERYTHROCYTE [DISTWIDTH] IN BLOOD BY AUTOMATED COUNT: 11.8 % (ref 11.6–14.8)
GLOBULIN SER-MCNC: 3.6 G/DL
HCT VFR BLD CALC: 40.1 % (ref 37–47)
HGB BLD-MCNC: 13.7 G/DL (ref 12–16)
MCV RBC AUTO: 90 FL (ref 80–99)
PHOSPHATE SERPL-MCNC: 3 MG/DL (ref 2.5–4.9)
PLATELET # BLD: 283 K/UL (ref 150–450)
POTASSIUM SERPL-SCNC: 3 MMOL/L (ref 3.5–5.1)
RBC # BLD AUTO: 4.46 M/UL (ref 4.2–5.4)
SODIUM SERPL-SCNC: 139 MMOL/L (ref 136–145)
WBC # BLD AUTO: 19.2 K/UL (ref 4.8–10.8)

## 2019-08-05 RX ADMIN — PROMETHAZINE HYDROCHLORIDE PRN ML: 6.25 SOLUTION ORAL at 11:07

## 2019-08-05 RX ADMIN — DEXTROSE AND SODIUM CHLORIDE SCH MLS/HR: 5; .45 INJECTION, SOLUTION INTRAVENOUS at 03:46

## 2019-08-05 RX ADMIN — IPRATROPIUM BROMIDE AND ALBUTEROL SULFATE SCH ML: .5; 3 SOLUTION RESPIRATORY (INHALATION) at 11:15

## 2019-08-05 RX ADMIN — IPRATROPIUM BROMIDE AND ALBUTEROL SULFATE SCH ML: .5; 3 SOLUTION RESPIRATORY (INHALATION) at 07:33

## 2019-08-05 RX ADMIN — METHYLPREDNISOLONE SODIUM SUCCINATE SCH MG: 125 INJECTION, POWDER, FOR SOLUTION INTRAMUSCULAR; INTRAVENOUS at 06:20

## 2019-08-05 RX ADMIN — METHYLPREDNISOLONE SODIUM SUCCINATE SCH MG: 125 INJECTION, POWDER, FOR SOLUTION INTRAMUSCULAR; INTRAVENOUS at 00:55

## 2019-08-05 RX ADMIN — LEVOFLOXACIN SCH MG: 500 TABLET, FILM COATED ORAL at 09:06

## 2019-08-05 RX ADMIN — IPRATROPIUM BROMIDE AND ALBUTEROL SULFATE SCH ML: .5; 3 SOLUTION RESPIRATORY (INHALATION) at 15:42

## 2019-08-05 RX ADMIN — IPRATROPIUM BROMIDE AND ALBUTEROL SULFATE SCH ML: .5; 3 SOLUTION RESPIRATORY (INHALATION) at 23:15

## 2019-08-05 RX ADMIN — IPRATROPIUM BROMIDE AND ALBUTEROL SULFATE SCH ML: .5; 3 SOLUTION RESPIRATORY (INHALATION) at 19:45

## 2019-08-05 RX ADMIN — INSULIN ASPART SCH UNITS: 100 INJECTION, SOLUTION INTRAVENOUS; SUBCUTANEOUS at 21:56

## 2019-08-05 RX ADMIN — INSULIN ASPART SCH UNITS: 100 INJECTION, SOLUTION INTRAVENOUS; SUBCUTANEOUS at 17:04

## 2019-08-05 RX ADMIN — METHYLPREDNISOLONE SODIUM SUCCINATE SCH MG: 125 INJECTION, POWDER, FOR SOLUTION INTRAMUSCULAR; INTRAVENOUS at 11:08

## 2019-08-05 RX ADMIN — METHYLPREDNISOLONE SODIUM SUCCINATE SCH MG: 125 INJECTION, POWDER, FOR SOLUTION INTRAMUSCULAR; INTRAVENOUS at 17:38

## 2019-08-05 RX ADMIN — INSULIN ASPART SCH UNITS: 100 INJECTION, SOLUTION INTRAVENOUS; SUBCUTANEOUS at 12:34

## 2019-08-05 RX ADMIN — INSULIN ASPART SCH UNITS: 100 INJECTION, SOLUTION INTRAVENOUS; SUBCUTANEOUS at 06:29

## 2019-08-05 RX ADMIN — DEXTROSE AND SODIUM CHLORIDE SCH MLS/HR: 5; .45 INJECTION, SOLUTION INTRAVENOUS at 14:03

## 2019-08-05 NOTE — NUR
NURSE NOTES:

Received report from NICK Rodríguez. Patient dozing off, easily wakes up. Bed in low position, 
locked, side rails up x3, call light within reach. O2 NC on, on continuous oximetry: O2Sat 
98%, HR 83. No distress noted at this time. Will continue to monitor.

## 2019-08-05 NOTE — NUR
NURSE/TRANSFER NOTE:



Ms. Harmon was transferred at approximately 0330 from [facility]. Report received from NICK Sánchez. Pt VSS but presented with a productive cough. Tx administered by RT and pt is now 
resting with no signs of distress. Orders reviewed and belongings accounted for with RN. Pt 
verbalized understanding of fall precautions and call bell usage. Pt is on continuos O2 
monitoring and is on 2L NC. Will continue to monitor.

-------------------------------------------------------------------------------

Addendum: 08/05/19 at 0431 by Dior Dai RN

-------------------------------------------------------------------------------

Side rails x2 up on pt bed. Wheezing heard on auscultation of anterior upper and lower 
lungs. Skin is intact. IV x2 are patent and intact.

## 2019-08-05 NOTE — NUR
SI:     COPD EXACERBATION

T. 97.6  RR 20 B/P 117/73

2L O2 SAT @ 98%

WBC 19.2 K 3.0







IS:      IVF D5NS@ 50ML/HR

 SOLU MEDROL IV

LEVAQUIN PO

******MED/SURG STATUS******

## 2019-08-05 NOTE — NUR
PT EVALUATION NOTE

Patient seen for initial evaluation, see complete evaluation for details. Patient presents 
with generalized weakness and SOB with activity. Patient declined OOB activities due to 
increased coughing with mobility. Patient will benefit from skilled 

inpatient PT intervention to address strength, balance and ambulation with proper breathing 
techniques. Anticipate discharge home once medically cleared by MD. No DME needs anticipated 
at this time. 

-------------------------------------------------------------------------------

Addendum: 08/05/19 at 1226 by ALLAN LEAL PT

-------------------------------------------------------------------------------

Amended: Links added.

## 2019-08-05 NOTE — CONSULTATION
History of Present Illness


General


Chief Complaint:  Dyspnea/Respdistress





Present Illness


Allergies:  


Coded Allergies:  


     No Known Allergies (Unverified , 8/4/19)





Medication History


Miscellaneous Medications


Unable to Obtain Medications (Unable To Obtain Meds), (Reported)





Patient History


Healthcare decision maker


self


Resuscitation status


Full Code


Advanced Directive on File








Physical Exam





Last 24 Hour Vital Signs








  Date Time  Temp Pulse Resp B/P (MAP) Pulse Ox O2 Delivery O2 Flow Rate FiO2


 


8/5/19 19:51  78 20  100 Nasal Cannula 2.0 28


 


8/5/19 19:51  78 20  99 Nasal Cannula 2.0 28


 


8/5/19 19:45  80 16  99 Nasal Cannula 2.0 28


 


8/5/19 16:00 98.5 94 18 140/74 (96) 98   


 


8/5/19 15:43  102 24  100 Nasal Cannula 2.0 28


 


8/5/19 15:33  101 24  98 Nasal Cannula 2.0 28


 


8/5/19 12:00 97.6 104 20 117/73 (88) 98   


 


8/5/19 11:16  99 22  100 Nasal Cannula 2.0 28


 


8/5/19 11:07  98 22  97 Nasal Cannula 2.0 28


 


8/5/19 09:00      Nasal Cannula 2.0 


 


8/5/19 08:00 98.2 105 18 113/74 (87) 96   


 


8/5/19 07:34  98 20  99 Nasal Cannula 2.0 28


 


8/5/19 07:34  98 22  98 Nasal Cannula 2.0 28


 


8/5/19 07:25  97 18  98 Nasal Cannula 2.0 28 8/5/19 03:30 98.5 78 18 117/67 (84) 96   


 


8/5/19 02:39      Nasal Cannula 2.0 28


 


8/5/19 02:38      Nasal Cannula 2.0 28


 


8/5/19 00:00 98.3 92 18 121/68 (85) 94   


 


8/5/19 00:00  89      


 


8/5/19 00:00      Nasal Cannula 2.0 


 


8/4/19 23:06  88 18  99 Nasal Cannula 2.0 28


 


8/4/19 22:56  93 18  96 Nasal Cannula 2.0 28

















Intake and Output  


 


 8/4/19 8/5/19





 19:00 07:00


 


Intake Total 170 ml 400 ml


 


Balance 170 ml 400 ml


 


  


 


Intake Oral 120 ml 


 


IV Total 50 ml 400 ml


 


# Voids 2 











Laboratory Tests








Test


  8/5/19


05:43


 


White Blood Count


  19.2 K/UL


(4.8-10.8)  H


 


Red Blood Count


  4.46 M/UL


(4.20-5.40)


 


Hemoglobin


  13.7 G/DL


(12.0-16.0)


 


Hematocrit


  40.1 %


(37.0-47.0)


 


Mean Corpuscular Volume 90 FL (80-99)  


 


Mean Corpuscular Hemoglobin


  30.8 PG


(27.0-31.0)


 


Mean Corpuscular Hemoglobin


Concent 34.2 G/DL


(32.0-36.0)


 


Red Cell Distribution Width


  11.8 %


(11.6-14.8)


 


Platelet Count


  283 K/UL


(150-450)


 


Mean Platelet Volume


  6.1 FL


(6.5-10.1)  L


 


Neutrophils (%) (Auto)


  % (45.0-75.0)


 


 


Lymphocytes (%) (Auto)


  % (20.0-45.0)


 


 


Monocytes (%) (Auto)  % (1.0-10.0)  


 


Eosinophils (%) (Auto)  % (0.0-3.0)  


 


Basophils (%) (Auto)  % (0.0-2.0)  


 


Differential Total Cells


Counted 100  


 


 


Neutrophils % (Manual) 95 % (45-75)  H


 


Lymphocytes % (Manual) 4 % (20-45)  L


 


Monocytes % (Manual) 1 % (1-10)  


 


Eosinophils % (Manual) 0 % (0-3)  


 


Basophils % (Manual) 0 % (0-2)  


 


Band Neutrophils 0 % (0-8)  


 


Platelet Estimate Adequate  


 


Platelet Morphology Normal  


 


Red Blood Cell Morphology Normal  


 


Erythrocyte Sedimentation Rate


  11 MM/HR


(0-20)


 


Sodium Level


  139 MMOL/L


(136-145)


 


Potassium Level


  3.0 MMOL/L


(3.5-5.1)  L


 


Chloride Level


  104 MMOL/L


()


 


Carbon Dioxide Level


  24 MMOL/L


(21-32)


 


Anion Gap


  11 mmol/L


(5-15)


 


Blood Urea Nitrogen


  12 mg/dL


(7-18)


 


Creatinine


  0.6 MG/DL


(0.55-1.30)


 


Estimat Glomerular Filtration


Rate > 60 mL/min


(>60)


 


Glucose Level


  162 MG/DL


()  H


 


Calcium Level


  8.7 MG/DL


(8.5-10.1)


 


Phosphorus Level


  3.0 MG/DL


(2.5-4.9)


 


Magnesium Level


  2.5 MG/DL


(1.8-2.4)  H


 


Total Bilirubin


  0.3 MG/DL


(0.2-1.0)


 


Aspartate Amino Transf


(AST/SGOT) 13 U/L (15-37)


L


 


Alanine Aminotransferase


(ALT/SGPT) 14 U/L (12-78)


 


 


Alkaline Phosphatase


  68 U/L


()


 


C-Reactive Protein,


Quantitative 1.2 mg/dL


(0.00-0.90)  H


 


Total Protein


  7.0 G/DL


(6.4-8.2)


 


Albumin


  3.4 G/DL


(3.4-5.0)


 


Globulin 3.6 g/dL  


 


Albumin/Globulin Ratio


  0.9 (1.0-2.7)


L








Height (Feet):  5


Height (Inches):  5.00


Weight (Pounds):  125


Medications





Current Medications








 Medications


  (Trade)  Dose


 Ordered  Sig/Alistair


 Route


 PRN Reason  Start Time


 Stop Time Status Last Admin


Dose Admin


 


 Albuterol/


 Ipratropium


  (Albuterol/


 Ipratropium)  3 ml  Q4H  PRN


 HHN


 Shortness of Breath  8/5/19 05:30


 8/9/19 17:29   


 


 


 Albuterol/


 Ipratropium


  (Albuterol/


 Ipratropium)  3 ml  Q4HRT


 HHN


   8/5/19 07:00


 8/9/19 10:59  8/5/19 19:45


 


 


 Dextrose


  (Dextrose 50%)    STAT  PRN


 IV


 Hypoglycemia  8/5/19 03:30


 9/3/19 03:29   


 


 


 Dextrose/Sodium


 Chloride  1,000 ml @ 


 50 mls/hr  Q20H


 IV


   8/5/19 03:30


 9/3/19 17:59  8/5/19 14:03


 


 


 Insulin Aspart


  (NovoLOG)    BEFORE MEALS AND  HS


 SUBQ


   8/5/19 06:30


 9/3/19 20:59  8/5/19 17:04


 


 


 Levofloxacin


  (Levaquin)  500 mg  DAILY


 ORAL


   8/5/19 09:00


 8/12/19 08:59  8/5/19 09:06


 


 


 Lorazepam


  (Ativan 2mg/ml


 1ml)  2 mg  Q4H  PRN


 IV


 For Anxiety  8/5/19 03:30


 8/11/19 03:29   


 


 


 Methylprednisolone


 Sodium Succinate


  (Solu-MEDROL)  60 mg  EVERY 6  HOURS


 IV


   8/5/19 06:00


 9/3/19 17:59  8/5/19 17:38


 


 


 Morphine Sulfate


  (Morphine


 Sulfate)  2 mg  Q4H  PRN


 IVP


 Moderate Pain (Pain Scale 4-6)  8/5/19 03:30


 8/11/19 03:29   


 


 


 Morphine Sulfate


  (Morphine


 Sulfate)  4 mg  Q4H  PRN


 IVP


 Severe Pain (Pain Scale 7-10)  8/5/19 03:30


 8/11/19 03:29   


 


 


 Nitroglycerin


  (Ntg)  0.4 mg  Q5M X 3 DOSES PRN


 SL


 Prn Chest Pain  8/5/19 03:30


 9/3/19 17:29   


 


 


 Ondansetron HCl


  (Zofran)  4 mg  Q6H  PRN


 IVP


 Nausea & Vomiting  8/5/19 03:30


 9/3/19 03:29   


 


 


 Pantoprazole


  (Protonix)  40 mg  DAILY


 IV


   8/6/19 09:00


 9/5/19 08:59   


 


 


 Promethazine HCl/


 Codeine


  (Phenergan with


 Codeine)  5 ml  Q6H  PRN


 ORAL


 For Cough  8/5/19 11:00


 9/4/19 10:59  8/5/19 11:07


 

















Star Monte MD Aug 5, 2019 20:45

## 2019-08-05 NOTE — NUR
NURSE NOTES:



Received report from NICK Pennington. Rounding done with outgoing nurse. Pt is a/o x 4, in bed. 
No respiratory distress noted. Denies pain at this time. Lt wrist is patetn, regular IV is 
running okay. Bed in lowest position, call light within reach. Will continue to monitor.

## 2019-08-05 NOTE — DIAGNOSTIC IMAGING REPORT
Indication: Dyspnea

 

Comparison:  8/4/2019

 

A single view chest radiograph was obtained.

 

Findings:

 

Cardiomediastinal appearance is within normal limits for age. The lungs are clear.

Pulmonary vascularity is appropriate. The diaphragmatic contour is smooth and

costophrenic angles are sharp. No pleural effusions are identified. The bones are

unremarkable.

 

Impression: No acute findings

## 2019-08-05 NOTE — GENERAL PROGRESS NOTE
Assessment/Plan


Problem List:  


(1) Respiratory distress


ICD Codes:  R06.03 - Acute respiratory distress


SNOMED:  710465807


(2) COPD with exacerbation


ICD Codes:  J44.1 - Chronic obstructive pulmonary disease with (acute) 

exacerbation


SNOMED:  011682350


(3) Infection


ICD Codes:  B99.9 - Unspecified infectious disease


SNOMED:  79501358


Status:  stable, progressing


Assessment/Plan:


o2 pyul tx abx cbc bmp am





Subjective


Constitutional:  Reports: weakness


Respiratory:  Reports: shortness of breath


Allergies:  


Coded Allergies:  


     No Known Allergies (Unverified , 8/4/19)


All Systems:  reviewed and negative except above


Subjective


o2nc sl anxious





Objective





Last 24 Hour Vital Signs








  Date Time  Temp Pulse Resp B/P (MAP) Pulse Ox O2 Delivery O2 Flow Rate FiO2


 


8/5/19 11:16  99 22  100 Nasal Cannula 2.0 28 8/5/19 11:07  98 22  97 Nasal Cannula 2.0 28 8/5/19 09:00      Nasal Cannula 2.0 


 


8/5/19 08:00 98.2 105 18 113/74 (87) 96   


 


8/5/19 07:34  98 20  99 Nasal Cannula 2.0 28 8/5/19 07:34  98 22  98 Nasal Cannula 2.0 28 8/5/19 07:25  97 18  98 Nasal Cannula 2.0 28 8/5/19 03:30 98.5 78 18 117/67 (84) 96   


 


8/5/19 02:39      Nasal Cannula 2.0 28 8/5/19 02:38      Nasal Cannula 2.0 28 8/5/19 00:00 98.3 92 18 121/68 (85) 94   


 


8/5/19 00:00  89      


 


8/5/19 00:00      Nasal Cannula 2.0 


 


8/4/19 23:06  88 18  99 Nasal Cannula 2.0 28 8/4/19 22:56  93 18  96 Nasal Cannula 2.0 28 8/4/19 20:00 98.4 98 20 138/86 (103) 96   


 


8/4/19 20:00      Nasal Cannula 2.0 


 


8/4/19 19:45  98      


 


8/4/19 19:19  97 20  99 Nasal Cannula 2.0 28 8/4/19 19:13  99 20  99 Nasal Cannula 2.0 28 8/4/19 19:11  99 20  97 Nasal Cannula 2.0 28 8/4/19 16:00      Nasal Cannula 2.0 


 


8/4/19 16:00 97.9 88 23 115/76 (89) 98   


 


8/4/19 15:30  88      


 


8/4/19 15:29  82 20  99 Nasal Cannula 2.0 28


 


8/4/19 15:15  78 18  98 Nasal Cannula 2.0 28

















Intake and Output  


 


 8/4/19 8/5/19





 18:59 06:59


 


Intake Total 120 ml 400 ml


 


Balance 120 ml 400 ml


 


  


 


Intake Oral 120 ml 


 


IV Total  400 ml


 


# Voids 2 








Laboratory Tests


8/4/19 17:50: 


Arterial Blood pH 7.435, Arterial Blood Partial Pressure CO2 33.1L, Arterial 

Blood Partial Pressure O2 71.9L, Arterial Blood HCO3 21.7L, Arterial Blood 

Oxygen Saturation 94.7L, Arterial Blood Base Excess -1.6, Mason Test Positive


8/5/19 05:43: 


White Blood Count 19.2H, Red Blood Count 4.46, Hemoglobin 13.7, Hematocrit 40.1

, Mean Corpuscular Volume 90, Mean Corpuscular Hemoglobin 30.8, Mean 

Corpuscular Hemoglobin Concent 34.2, Red Cell Distribution Width 11.8, Platelet 

Count 283, Mean Platelet Volume 6.1L, Neutrophils (%) (Auto) , Lymphocytes (%) (

Auto) , Monocytes (%) (Auto) , Eosinophils (%) (Auto) , Basophils (%) (Auto) , 

Differential Total Cells Counted 100, Neutrophils % (Manual) 95H, Lymphocytes % 

(Manual) 4L, Monocytes % (Manual) 1, Eosinophils % (Manual) 0, Basophils % (

Manual) 0, Band Neutrophils 0, Platelet Estimate Adequate, Platelet Morphology 

Normal, Red Blood Cell Morphology Normal, Erythrocyte Sedimentation Rate 11, 

Sodium Level 139, Potassium Level 3.0L, Chloride Level 104, Carbon Dioxide 

Level 24, Anion Gap 11, Blood Urea Nitrogen 12, Creatinine 0.6, Estimat 

Glomerular Filtration Rate > 60, Glucose Level 162H, Calcium Level 8.7, 

Phosphorus Level 3.0, Magnesium Level 2.5H, Total Bilirubin 0.3, Aspartate 

Amino Transf (AST/SGOT) 13L, Alanine Aminotransferase (ALT/SGPT) 14, Alkaline 

Phosphatase 68, C-Reactive Protein, Quantitative 1.2H, Total Protein 7.0, 

Albumin 3.4, Globulin 3.6, Albumin/Globulin Ratio 0.9L


Height (Feet):  5


Height (Inches):  5.00


Weight (Pounds):  125


General Appearance:  lethargic


EENT:  normal ENT inspection


Neck:  normal alignment


Cardiovascular:  normal peripheral pulses, normal rate, regular rhythm


Respiratory/Chest:  chest wall non-tender, lungs clear, normal breath sounds


Abdomen:  normal bowel sounds, non tender, soft


Extremities:  normal inspection


Edema:  no edema noted Arm (L), no edema noted Arm (R), no edema noted Leg (L), 

no edema noted Leg (R), no edema noted Pedal (L), no edema noted Pedal (R), no 

edema noted Generalized


Neurologic:  responsive, motor weakness


Skin:  normal pigmentation, warm/dry











Casper Carey DO Aug 5, 2019 12:40

## 2019-08-05 NOTE — NUR
NURSE NOTES:



Dr. Carey ordered ambulatory for DVT, Diabetic diet 1800kcal, kcl 40meq x1, cbc,bmp tomorrow 
morning. Noted and carried out.

## 2019-08-05 NOTE — INFECTIOUS DISEASES PROG NOTE
Assessment/Plan


Assessment/Plan








Assessment:


COPD exacerbation


  -8/5 CXR: no acute findings


  -CXR: 1.  No acute cardiothoracic process. Gassy stomach or bowel in the 

upper abdomen.


 


Afebrile


Leukocytosis; increased (on high dose steroids)


  -u/a no pyuria, +hematuria





asthma/COPD


tobacco abuse


diabetes





Plan:


-Cont Levaquin #2/3-5 for COPD exacerbation


   -8/4 SP Ceftriaxone x1, Azithromycin x1





-f/u cx


-Monitor CBC/BMP, temperatures


-respiratory supportive treatment


-aspiration precautions





Thank you for this consultation. Will continue to follow along with you.





Discussed with RN.





Subjective


Allergies:  


Coded Allergies:  


     No Known Allergies (Unverified , 8/4/19)


Subjective


afebrile


wbc increased


on high dose steroids





Objective


Vital Signs





Last 24 Hour Vital Signs








  Date Time  Temp Pulse Resp B/P (MAP) Pulse Ox O2 Delivery O2 Flow Rate FiO2


 


8/5/19 12:00 97.6 104 20 117/73 (88) 98   


 


8/5/19 11:16  99 22  100 Nasal Cannula 2.0 28


 


8/5/19 11:07  98 22  97 Nasal Cannula 2.0 28


 


8/5/19 09:00      Nasal Cannula 2.0 


 


8/5/19 08:00 98.2 105 18 113/74 (87) 96   


 


8/5/19 07:34  98 20  99 Nasal Cannula 2.0 28


 


8/5/19 07:34  98 22  98 Nasal Cannula 2.0 28


 


8/5/19 07:25  97 18  98 Nasal Cannula 2.0 28


 


8/5/19 03:30 98.5 78 18 117/67 (84) 96   


 


8/5/19 02:39      Nasal Cannula 2.0 28


 


8/5/19 02:38      Nasal Cannula 2.0 28


 


8/5/19 00:00 98.3 92 18 121/68 (85) 94   


 


8/5/19 00:00  89      


 


8/5/19 00:00      Nasal Cannula 2.0 


 


8/4/19 23:06  88 18  99 Nasal Cannula 2.0 28


 


8/4/19 22:56  93 18  96 Nasal Cannula 2.0 28


 


8/4/19 20:00 98.4 98 20 138/86 (103) 96   


 


8/4/19 20:00      Nasal Cannula 2.0 


 


8/4/19 19:45  98      


 


8/4/19 19:19  97 20  99 Nasal Cannula 2.0 28 8/4/19 19:13  99 20  99 Nasal Cannula 2.0 28


 


8/4/19 19:11  99 20  97 Nasal Cannula 2.0 28 8/4/19 16:00      Nasal Cannula 2.0 


 


8/4/19 16:00 97.9 88 23 115/76 (89) 98   


 


8/4/19 15:30  88      


 


8/4/19 15:29  82 20  99 Nasal Cannula 2.0 28


 


8/4/19 15:15  78 18  98 Nasal Cannula 2.0 28








Height (Feet):  5


Height (Inches):  5.00


Weight (Pounds):  125


Objective


GENERAL:  Sleepy in bed, not responding to questions.


CARDIOVASCULAR:  No murmur.


LUNGS:  Poor air exchange.


ABDOMEN:  Bowel sounds distant.


EXTREMITIES:  No cyanosis or edema.


NEUROLOGIC:  The patient is flaccid in bed, does not want to follow


directions.





Laboratory Tests








Test


  8/4/19


17:50 8/5/19


05:43


 


Arterial Blood pH


  7.435


(7.350-7.450) 


 


 


Arterial Blood Partial


Pressure CO2 33.1 mmHg


(35.0-45.0)  L 


 


 


Arterial Blood Partial


Pressure O2 71.9 mmHg


(75.0-100.0)  L 


 


 


Arterial Blood HCO3


  21.7 mmol/L


(22.0-26.0)  L 


 


 


Arterial Blood Oxygen


Saturation 94.7 %


()  L 


 


 


Arterial Blood Base Excess -1.6 (-2-2)   


 


Mason Test Positive   


 


White Blood Count


  


  19.2 K/UL


(4.8-10.8)  H


 


Red Blood Count


  


  4.46 M/UL


(4.20-5.40)


 


Hemoglobin


  


  13.7 G/DL


(12.0-16.0)


 


Hematocrit


  


  40.1 %


(37.0-47.0)


 


Mean Corpuscular Volume  90 FL (80-99)  


 


Mean Corpuscular Hemoglobin


  


  30.8 PG


(27.0-31.0)


 


Mean Corpuscular Hemoglobin


Concent 


  34.2 G/DL


(32.0-36.0)


 


Red Cell Distribution Width


  


  11.8 %


(11.6-14.8)


 


Platelet Count


  


  283 K/UL


(150-450)


 


Mean Platelet Volume


  


  6.1 FL


(6.5-10.1)  L


 


Neutrophils (%) (Auto)


  


  % (45.0-75.0)


 


 


Lymphocytes (%) (Auto)


  


  % (20.0-45.0)


 


 


Monocytes (%) (Auto)   % (1.0-10.0)  


 


Eosinophils (%) (Auto)   % (0.0-3.0)  


 


Basophils (%) (Auto)   % (0.0-2.0)  


 


Differential Total Cells


Counted 


  100  


 


 


Neutrophils % (Manual)  95 % (45-75)  H


 


Lymphocytes % (Manual)  4 % (20-45)  L


 


Monocytes % (Manual)  1 % (1-10)  


 


Eosinophils % (Manual)  0 % (0-3)  


 


Basophils % (Manual)  0 % (0-2)  


 


Band Neutrophils  0 % (0-8)  


 


Platelet Estimate  Adequate  


 


Platelet Morphology  Normal  


 


Red Blood Cell Morphology  Normal  


 


Erythrocyte Sedimentation Rate


  


  11 MM/HR


(0-20)


 


Sodium Level


  


  139 MMOL/L


(136-145)


 


Potassium Level


  


  3.0 MMOL/L


(3.5-5.1)  L


 


Chloride Level


  


  104 MMOL/L


()


 


Carbon Dioxide Level


  


  24 MMOL/L


(21-32)


 


Anion Gap


  


  11 mmol/L


(5-15)


 


Blood Urea Nitrogen


  


  12 mg/dL


(7-18)


 


Creatinine


  


  0.6 MG/DL


(0.55-1.30)


 


Estimat Glomerular Filtration


Rate 


  > 60 mL/min


(>60)


 


Glucose Level


  


  162 MG/DL


()  H


 


Calcium Level


  


  8.7 MG/DL


(8.5-10.1)


 


Phosphorus Level


  


  3.0 MG/DL


(2.5-4.9)


 


Magnesium Level


  


  2.5 MG/DL


(1.8-2.4)  H


 


Total Bilirubin


  


  0.3 MG/DL


(0.2-1.0)


 


Aspartate Amino Transf


(AST/SGOT) 


  13 U/L (15-37)


L


 


Alanine Aminotransferase


(ALT/SGPT) 


  14 U/L (12-78)


 


 


Alkaline Phosphatase


  


  68 U/L


()


 


C-Reactive Protein,


Quantitative 


  1.2 mg/dL


(0.00-0.90)  H


 


Total Protein


  


  7.0 G/DL


(6.4-8.2)


 


Albumin


  


  3.4 G/DL


(3.4-5.0)


 


Globulin  3.6 g/dL  


 


Albumin/Globulin Ratio


  


  0.9 (1.0-2.7)


L











Current Medications








 Medications


  (Trade)  Dose


 Ordered  Sig/Alistair


 Route


 PRN Reason  Start Time


 Stop Time Status Last Admin


Dose Admin


 


 Albuterol/


 Ipratropium


  (Albuterol/


 Ipratropium)  3 ml  Q4H  PRN


 HHN


 Shortness of Breath  8/5/19 05:30


 8/9/19 17:29   


 


 


 Albuterol/


 Ipratropium


  (Albuterol/


 Ipratropium)  3 ml  Q4HRT


 HHN


   8/5/19 07:00


 8/9/19 10:59  8/5/19 11:15


 


 


 Dextrose


  (Dextrose 50%)    STAT  PRN


 IV


 Hypoglycemia  8/5/19 03:30


 9/3/19 03:29   


 


 


 Dextrose/Sodium


 Chloride  1,000 ml @ 


 50 mls/hr  Q20H


 IV


   8/5/19 03:30


 9/3/19 17:59  8/5/19 14:03


 


 


 Insulin Aspart


  (NovoLOG)    BEFORE MEALS AND  HS


 SUBQ


   8/5/19 06:30


 9/3/19 20:59  8/5/19 12:34


 


 


 Levofloxacin


  (Levaquin)  500 mg  DAILY


 ORAL


   8/5/19 09:00


 8/12/19 08:59  8/5/19 09:06


 


 


 Lorazepam


  (Ativan 2mg/ml


 1ml)  2 mg  Q4H  PRN


 IV


 For Anxiety  8/5/19 03:30


 8/11/19 03:29   


 


 


 Methylprednisolone


 Sodium Succinate


  (Solu-MEDROL)  60 mg  EVERY 6  HOURS


 IV


   8/5/19 06:00


 9/3/19 17:59  8/5/19 11:08


 


 


 Morphine Sulfate


  (Morphine


 Sulfate)  2 mg  Q4H  PRN


 IVP


 Moderate Pain (Pain Scale 4-6)  8/5/19 03:30


 8/11/19 03:29   


 


 


 Morphine Sulfate


  (Morphine


 Sulfate)  4 mg  Q4H  PRN


 IVP


 Severe Pain (Pain Scale 7-10)  8/5/19 03:30


 8/11/19 03:29   


 


 


 Nitroglycerin


  (Ntg)  0.4 mg  Q5M X 3 DOSES PRN


 SL


 Prn Chest Pain  8/5/19 03:30


 9/3/19 17:29   


 


 


 Ondansetron HCl


  (Zofran)  4 mg  Q6H  PRN


 IVP


 Nausea & Vomiting  8/5/19 03:30


 9/3/19 03:29   


 


 


 Pantoprazole


  (Protonix)  40 mg  DAILY


 IV


   8/5/19 09:00


 9/4/19 08:59  8/5/19 09:06


 


 


 Promethazine HCl/


 Codeine


  (Phenergan with


 Codeine)  5 ml  Q6H  PRN


 ORAL


 For Cough  8/5/19 11:00


 9/4/19 10:59  8/5/19 11:07


 

















Leida Fernando M.D. Aug 5, 2019 14:43

## 2019-08-05 NOTE — NUR
RD ASSESSMENT & RECOMMENDATIONS

SEE CARE ACTIVITY FOR COMPLETE ASSESSMENT



DAILY ESTIMATED NEEDS:

Needs based on Pulmonary, 57kg 

25-30  kcals/kg 

9754-1029  total kcals

1-1.5  g protein/kg

57-86  g total protein 

25-30  mL/kg

4902-4719  total fluid mLs



NUTRITION DIAGNOSIS:

Altered nutrition related lab values R/T diabetes as evidenced by elev BGs

(162, 147), pt also on solumedrol.







CURRENT DIET:REGULAR    



 



PO DIET RECOMMENDATIONS:

[facility] 



 





ADDITIONAL RECOMMENDATIONS:

* Standing wt for accurate CBW 

* Monitor BGs closely: h/o DM + steroidal med + on D5 IVF 

* Monitor lytes, replete as needed (low K) 








-------------------------------------------------------------------------------

Addendum: 08/05/19 at 1057 by SHAHZAD DEAN RD

-------------------------------------------------------------------------------

* Check A1C for eval of glycemic control

## 2019-08-06 VITALS — DIASTOLIC BLOOD PRESSURE: 76 MMHG | SYSTOLIC BLOOD PRESSURE: 125 MMHG

## 2019-08-06 VITALS — SYSTOLIC BLOOD PRESSURE: 128 MMHG | DIASTOLIC BLOOD PRESSURE: 71 MMHG

## 2019-08-06 VITALS — SYSTOLIC BLOOD PRESSURE: 144 MMHG | DIASTOLIC BLOOD PRESSURE: 77 MMHG

## 2019-08-06 VITALS — SYSTOLIC BLOOD PRESSURE: 125 MMHG | DIASTOLIC BLOOD PRESSURE: 90 MMHG

## 2019-08-06 VITALS — DIASTOLIC BLOOD PRESSURE: 70 MMHG | SYSTOLIC BLOOD PRESSURE: 125 MMHG

## 2019-08-06 VITALS — SYSTOLIC BLOOD PRESSURE: 122 MMHG | DIASTOLIC BLOOD PRESSURE: 76 MMHG

## 2019-08-06 LAB
ADD MANUAL DIFF: YES
ANION GAP SERPL CALC-SCNC: 6 MMOL/L (ref 5–15)
BUN SERPL-MCNC: 18 MG/DL (ref 7–18)
CALCIUM SERPL-MCNC: 8.6 MG/DL (ref 8.5–10.1)
CHLORIDE SERPL-SCNC: 106 MMOL/L (ref 98–107)
CO2 SERPL-SCNC: 27 MMOL/L (ref 21–32)
CREAT SERPL-MCNC: 0.6 MG/DL (ref 0.55–1.3)
ERYTHROCYTE [DISTWIDTH] IN BLOOD BY AUTOMATED COUNT: 12.6 % (ref 11.6–14.8)
HCT VFR BLD CALC: 38.7 % (ref 37–47)
HGB BLD-MCNC: 13.2 G/DL (ref 12–16)
MCV RBC AUTO: 90 FL (ref 80–99)
PLATELET # BLD: 292 K/UL (ref 150–450)
POTASSIUM SERPL-SCNC: 3.8 MMOL/L (ref 3.5–5.1)
RBC # BLD AUTO: 4.28 M/UL (ref 4.2–5.4)
SODIUM SERPL-SCNC: 139 MMOL/L (ref 136–145)
WBC # BLD AUTO: 25.6 K/UL (ref 4.8–10.8)

## 2019-08-06 RX ADMIN — INSULIN ASPART SCH UNITS: 100 INJECTION, SOLUTION INTRAVENOUS; SUBCUTANEOUS at 06:14

## 2019-08-06 RX ADMIN — IPRATROPIUM BROMIDE AND ALBUTEROL SULFATE SCH ML: .5; 3 SOLUTION RESPIRATORY (INHALATION) at 03:00

## 2019-08-06 RX ADMIN — IPRATROPIUM BROMIDE AND ALBUTEROL SULFATE SCH ML: .5; 3 SOLUTION RESPIRATORY (INHALATION) at 07:42

## 2019-08-06 RX ADMIN — LEVOFLOXACIN SCH MG: 500 TABLET, FILM COATED ORAL at 08:44

## 2019-08-06 RX ADMIN — METHYLPREDNISOLONE SODIUM SUCCINATE SCH MG: 125 INJECTION, POWDER, FOR SOLUTION INTRAMUSCULAR; INTRAVENOUS at 23:52

## 2019-08-06 RX ADMIN — INSULIN ASPART SCH UNITS: 100 INJECTION, SOLUTION INTRAVENOUS; SUBCUTANEOUS at 20:48

## 2019-08-06 RX ADMIN — METHYLPREDNISOLONE SODIUM SUCCINATE SCH MG: 125 INJECTION, POWDER, FOR SOLUTION INTRAMUSCULAR; INTRAVENOUS at 00:05

## 2019-08-06 RX ADMIN — IPRATROPIUM BROMIDE AND ALBUTEROL SULFATE SCH ML: .5; 3 SOLUTION RESPIRATORY (INHALATION) at 19:28

## 2019-08-06 RX ADMIN — IPRATROPIUM BROMIDE AND ALBUTEROL SULFATE SCH ML: .5; 3 SOLUTION RESPIRATORY (INHALATION) at 23:27

## 2019-08-06 RX ADMIN — PANTOPRAZOLE SODIUM SCH MG: 40 INJECTION, POWDER, FOR SOLUTION INTRAVENOUS at 08:44

## 2019-08-06 RX ADMIN — INSULIN ASPART SCH UNITS: 100 INJECTION, SOLUTION INTRAVENOUS; SUBCUTANEOUS at 17:17

## 2019-08-06 RX ADMIN — DEXTROSE AND SODIUM CHLORIDE SCH MLS/HR: 5; .45 INJECTION, SOLUTION INTRAVENOUS at 21:40

## 2019-08-06 RX ADMIN — METHYLPREDNISOLONE SODIUM SUCCINATE SCH MG: 125 INJECTION, POWDER, FOR SOLUTION INTRAMUSCULAR; INTRAVENOUS at 17:17

## 2019-08-06 RX ADMIN — PROMETHAZINE HYDROCHLORIDE PRN ML: 6.25 SOLUTION ORAL at 01:20

## 2019-08-06 RX ADMIN — METHYLPREDNISOLONE SODIUM SUCCINATE SCH MG: 125 INJECTION, POWDER, FOR SOLUTION INTRAMUSCULAR; INTRAVENOUS at 06:03

## 2019-08-06 RX ADMIN — IPRATROPIUM BROMIDE AND ALBUTEROL SULFATE SCH ML: .5; 3 SOLUTION RESPIRATORY (INHALATION) at 15:26

## 2019-08-06 RX ADMIN — METHYLPREDNISOLONE SODIUM SUCCINATE SCH MG: 125 INJECTION, POWDER, FOR SOLUTION INTRAMUSCULAR; INTRAVENOUS at 11:53

## 2019-08-06 RX ADMIN — INSULIN ASPART SCH UNITS: 100 INJECTION, SOLUTION INTRAVENOUS; SUBCUTANEOUS at 11:53

## 2019-08-06 RX ADMIN — IPRATROPIUM BROMIDE AND ALBUTEROL SULFATE SCH ML: .5; 3 SOLUTION RESPIRATORY (INHALATION) at 11:58

## 2019-08-06 RX ADMIN — PROMETHAZINE HYDROCHLORIDE PRN ML: 6.25 SOLUTION ORAL at 11:02

## 2019-08-06 NOTE — PULMONOLOGY PROGRESS NOTE
Subjective


Allergies:  


Coded Allergies:  


     No Known Allergies (Unverified , 8/4/19)





Objective





Last 24 Hour Vital Signs








  Date Time  Temp Pulse Resp B/P (MAP) Pulse Ox O2 Delivery O2 Flow Rate FiO2


 


8/6/19 21:00      Nasal Cannula 2.0 


 


8/6/19 20:00 98.6 98 17 144/77 (99) 96   





  98      


 


8/6/19 19:38  97 20  98 Nasal Cannula 2.0 28


 


8/6/19 19:28  101 22  99 Nasal Cannula 3.0 32


 


8/6/19 19:28  101 22  99 Nasal Cannula 3.0 32


 


8/6/19 16:00 98.2 78 16 128/71 (90) 99   





  78      


 


8/6/19 15:27  87 22  100 Nasal Cannula 2.0 28 8/6/19 15:20  82 22  96 Nasal Cannula 2.0 28


 


8/6/19 12:00 98.7 85 17 125/70 (88) 96   





  96      


 


8/6/19 11:59  101 20  98 Nasal Cannula 2.0 28 8/6/19 11:50  90 20  97 Nasal Cannula 2.0 28 8/6/19 09:00      Nasal Cannula 2.0 


 


8/6/19 08:00 98.4 96 16 122/76 (91) 96   





  96      


 


8/6/19 07:43  89 22  99 Nasal Cannula 2.0 28 8/6/19 07:42  93 24  99 Nasal Cannula 2.0 28 8/6/19 07:34  94 20  99 Nasal Cannula 2.0 28 8/6/19 04:43  70 18  97   


 


8/6/19 04:00 98.4 97 20 125/90 (102) 95   


 


8/6/19 03:13      Nasal Cannula 2.0 28 8/6/19 03:12      Nasal Cannula 2.0 28


 


8/6/19 00:00 98.4 79 17 125/76 (92) 96   


 


8/5/19 23:21  75 20  100 Nasal Cannula 2.0 28 8/5/19 23:14  75 16  99 Nasal Cannula 2.0 28

















Intake and Output  


 


 8/5/19 8/6/19





 19:00 07:00


 


Intake Total 790 ml 740 ml


 


Balance 790 ml 740 ml


 


  


 


Intake Oral 240 ml 240 ml


 


IV Total 550 ml 500 ml


 


# Voids 2 2








Laboratory Tests


8/6/19 05:15: 


White Blood Count 25.6*H, Red Blood Count 4.28, Hemoglobin 13.2, Hematocrit 38.7

, Mean Corpuscular Volume 90, Mean Corpuscular Hemoglobin 30.8, Mean 

Corpuscular Hemoglobin Concent 34.1, Red Cell Distribution Width 12.6, Platelet 

Count 292, Mean Platelet Volume 5.6L, Neutrophils (%) (Auto) , Lymphocytes (%) (

Auto) , Monocytes (%) (Auto) , Eosinophils (%) (Auto) , Basophils (%) (Auto) , 

Differential Total Cells Counted 100, Neutrophils % (Manual) 94H, Lymphocytes % 

(Manual) 4L, Monocytes % (Manual) 2, Eosinophils % (Manual) 0, Basophils % (

Manual) 0, Band Neutrophils 0, Platelet Estimate Adequate, Platelet Morphology 

Normal, Red Blood Cell Morphology Normal, Sodium Level 139, Potassium Level 3.8

, Chloride Level 106, Carbon Dioxide Level 27, Anion Gap 6, Blood Urea Nitrogen 

18, Creatinine 0.6, Estimat Glomerular Filtration Rate > 60, Glucose Level 142H

, Calcium Level 8.6





Current Medications








 Medications


  (Trade)  Dose


 Ordered  Sig/Alistair


 Route


 PRN Reason  Start Time


 Stop Time Status Last Admin


Dose Admin


 


 Albuterol/


 Ipratropium


  (Albuterol/


 Ipratropium)  3 ml  Q4H  PRN


 HHN


 Shortness of Breath  8/5/19 05:30


 8/9/19 17:29   


 


 


 Albuterol/


 Ipratropium


  (Albuterol/


 Ipratropium)  3 ml  Q4HRT


 HHN


   8/5/19 07:00


 8/9/19 10:59  8/6/19 19:28


 


 


 Dextrose


  (Dextrose 50%)    STAT  PRN


 IV


 Hypoglycemia  8/5/19 03:30


 9/3/19 03:29   


 


 


 Dextrose/Sodium


 Chloride  1,000 ml @ 


 50 mls/hr  Q20H


 IV


   8/5/19 03:30


 9/3/19 17:59  8/6/19 21:40


 


 


 Insulin Aspart


  (NovoLOG)    BEFORE MEALS AND  HS


 SUBQ


   8/5/19 06:30


 9/3/19 20:59  8/6/19 20:48


 


 


 Levofloxacin


  (Levaquin)  500 mg  DAILY


 ORAL


   8/5/19 09:00


 8/12/19 08:59  8/6/19 08:44


 


 


 Lorazepam


  (Ativan 2mg/ml


 1ml)  2 mg  Q4H  PRN


 IV


 For Anxiety  8/5/19 03:30


 8/11/19 03:29   


 


 


 Methylprednisolone


 Sodium Succinate


  (Solu-MEDROL)  60 mg  EVERY 6  HOURS


 IV


   8/5/19 06:00


 9/3/19 17:59  8/6/19 17:17


 


 


 Morphine Sulfate


  (Morphine


 Sulfate)  2 mg  Q4H  PRN


 IVP


 Moderate Pain (Pain Scale 4-6)  8/5/19 03:30


 8/11/19 03:29   


 


 


 Morphine Sulfate


  (Morphine


 Sulfate)  4 mg  Q4H  PRN


 IVP


 Severe Pain (Pain Scale 7-10)  8/5/19 03:30


 8/11/19 03:29   


 


 


 Nitroglycerin


  (Ntg)  0.4 mg  Q5M X 3 DOSES PRN


 SL


 Prn Chest Pain  8/5/19 03:30


 9/3/19 17:29   


 


 


 Ondansetron HCl


  (Zofran)  4 mg  Q6H  PRN


 IVP


 Nausea & Vomiting  8/5/19 03:30


 9/3/19 03:29   


 


 


 Pantoprazole


  (Protonix)  40 mg  DAILY


 IV


   8/6/19 09:00


 9/5/19 08:59  8/6/19 08:44


 


 


 Promethazine HCl/


 Codeine


  (Phenergan with


 Codeine)  5 ml  Q6H  PRN


 ORAL


 For Cough  8/5/19 11:00


 9/4/19 10:59  8/6/19 11:02


 

















Star Monte MD Aug 6, 2019 21:44

## 2019-08-06 NOTE — GENERAL PROGRESS NOTE
Assessment/Plan


Problem List:  


(1) Respiratory distress


ICD Codes:  R06.03 - Acute respiratory distress


SNOMED:  593028531


(2) COPD with exacerbation


ICD Codes:  J44.1 - Chronic obstructive pulmonary disease with (acute) 

exacerbation


SNOMED:  687841776


(3) Infection


ICD Codes:  B99.9 - Unspecified infectious disease


SNOMED:  90040657


Status:  stable, progressing


Assessment/Plan:


o2 pulm tx abx cbc bmp am dc plan





Subjective


Constitutional:  Reports: weakness


Allergies:  


Coded Allergies:  


     No Known Allergies (Unverified , 8/4/19)


All Systems:  reviewed and negative except above


Subjective


o2nc sl anxious





Objective





Last 24 Hour Vital Signs








  Date Time  Temp Pulse Resp B/P (MAP) Pulse Ox O2 Delivery O2 Flow Rate FiO2


 


8/6/19 11:59  101 20  98 Nasal Cannula 2.0 28 8/6/19 11:50  90 20  97 Nasal Cannula 2.0 28 8/6/19 08:00 98.4 96 16 122/76 (91) 96   





  96      


 


8/6/19 07:43  89 22  99 Nasal Cannula 2.0 28 8/6/19 07:42  93 24  99 Nasal Cannula 2.0 28 8/6/19 07:34  94 20  99 Nasal Cannula 2.0 28 8/6/19 04:43  70 18  97   


 


8/6/19 04:00 98.4 97 20 125/90 (102) 95   


 


8/6/19 03:13      Nasal Cannula 2.0 28 8/6/19 03:12      Nasal Cannula 2.0 28 8/6/19 00:00 98.4 79 17 125/76 (92) 96   


 


8/5/19 23:21  75 20  100 Nasal Cannula 2.0 28 8/5/19 23:14  75 16  99 Nasal Cannula 2.0 28 8/5/19 21:00      Nasal Cannula 2.0 


 


8/5/19 20:00 98.3 77 18 128/74 (92) 96   


 


8/5/19 19:51  78 20  100 Nasal Cannula 2.0 28 8/5/19 19:51  78 20  99 Nasal Cannula 2.0 28 8/5/19 19:45  80 16  99 Nasal Cannula 2.0 28


 


8/5/19 16:00 98.5 94 18 140/74 (96) 98   


 


8/5/19 15:43  102 24  100 Nasal Cannula 2.0 28 8/5/19 15:33  101 24  98 Nasal Cannula 2.0 28

















Intake and Output  


 


 8/5/19 8/6/19





 18:59 06:59


 


Intake Total 840 ml 740 ml


 


Balance 840 ml 740 ml


 


  


 


Intake Oral 240 ml 240 ml


 


IV Total 600 ml 500 ml


 


# Voids 2 2








Laboratory Tests


8/6/19 05:15: 


White Blood Count 25.6*H, Red Blood Count 4.28, Hemoglobin 13.2, Hematocrit 38.7

, Mean Corpuscular Volume 90, Mean Corpuscular Hemoglobin 30.8, Mean 

Corpuscular Hemoglobin Concent 34.1, Red Cell Distribution Width 12.6, Platelet 

Count 292, Mean Platelet Volume 5.6L, Neutrophils (%) (Auto) , Lymphocytes (%) (

Auto) , Monocytes (%) (Auto) , Eosinophils (%) (Auto) , Basophils (%) (Auto) , 

Differential Total Cells Counted 100, Neutrophils % (Manual) 94H, Lymphocytes % 

(Manual) 4L, Monocytes % (Manual) 2, Eosinophils % (Manual) 0, Basophils % (

Manual) 0, Band Neutrophils 0, Platelet Estimate Adequate, Platelet Morphology 

Normal, Red Blood Cell Morphology Normal, Sodium Level 139, Potassium Level 3.8

, Chloride Level 106, Carbon Dioxide Level 27, Anion Gap 6, Blood Urea Nitrogen 

18, Creatinine 0.6, Estimat Glomerular Filtration Rate > 60, Glucose Level 142H

, Calcium Level 8.6


Height (Feet):  5


Height (Inches):  5.00


Weight (Pounds):  125


General Appearance:  lethargic


EENT:  normal ENT inspection


Neck:  normal alignment


Cardiovascular:  normal peripheral pulses, normal rate, regular rhythm


Respiratory/Chest:  chest wall non-tender, lungs clear, normal breath sounds


Abdomen:  normal bowel sounds, non tender, soft


Extremities:  normal inspection


Edema:  no edema noted Arm (L), no edema noted Arm (R), no edema noted Leg (L), 

no edema noted Leg (R), no edema noted Pedal (L), no edema noted Pedal (R), no 

edema noted Generalized


Neurologic:  motor weakness


Skin:  normal pigmentation, warm/dry











Casper Carey DO Aug 6, 2019 12:56

## 2019-08-06 NOTE — NUR
HAND-OFF: 

Report given to NICK Junior. Received phone call from lab notifying of WBC 25.6. Passed onto 
day shift RN on report.

## 2019-08-06 NOTE — NUR
NURSE NOTES:

Notified MD at 2007 regards to patient having difficulty coughing up/ clearing up mucous 
even after respiratory treatment. Patient verbalized that she feels suffocated when she was 
coughing. Awaiting for any new orders to follow up regarding patient's condition. Patient's 
HR/BP was initially elevated after the breathing treatment HR:98 and BP: 144/77. Patient is 
currently sleeping, breathing unlabored on 2L O2 via NC, SatO2 98%, and HR:81. Will continue 
to monitor frequently. Call light is within reach.

## 2019-08-06 NOTE — NUR
NOTES







PT WITH DCP HOME WITH NO NEEDS, PENDING CLEARANCE FROM PULM AND ID. NURSE TO FOLLOW UP ON 
DC.

## 2019-08-06 NOTE — NUR
NURSE NOTES:

Received report from Jerri ROMERO, pt a/a/o x4 seating in bed, eating breakfast with no signs 
of distress or other issues at this time. IV left AC gauge#20 running D5 1/2NS @50ml/hr. 
accuchecks BID. pt is in [facility] medium diet, patient in able to tolerated well with no n/v. 
call light within reach, bed in lowest position. side rales up x2. I will f/u as needed.

## 2019-08-06 NOTE — NUR
SI:     COPD

T. 98.7 HR 87 RR 17 B/P 125/70

2L NC O2 SAT @ 98%

WBC 25.6







IS:     SOLU MEDROL IV

PROTONIX IV

LEVAQUIN PO

IVF D5NS@ 50ML/HR

*********MED/SURG STATUS*****

## 2019-08-06 NOTE — NUR
NURSE NOTES:

Patient resting comfortably, respirations unlabored. No cough at this time. O2Sat 97%, HR 
86, on O2 2L NC.

## 2019-08-06 NOTE — INFECTIOUS DISEASES PROG NOTE
Assessment/Plan


Assessment/Plan








Assessment:


COPD exacerbation


  -8/5 CXR: no acute findings


  -CXR: 1.  No acute cardiothoracic process. Gassy stomach or bowel in the 

upper abdomen.


 


Afebrile


Leukocytosis; increased (on high dose steroids)


  -u/a no pyuria, +hematuria





asthma/COPD


tobacco abuse


diabetes





Plan:


-Cont Levaquin #3/5 for COPD exacerbation


   -8/4 SP Ceftriaxone x1, Azithromycin x1





-f/u cx


-Monitor CBC/BMP, temperatures


-respiratory supportive treatment


-aspiration precautions


-Cdiff if diarrhea


-BCx x2





Thank you for this consultation. Will continue to follow along with you.





Discussed with RN.





Subjective


Allergies:  


Coded Allergies:  


     No Known Allergies (Unverified , 8/4/19)


Subjective


afebrile


wbc increased


on high dose steroids





Objective


Vital Signs





Last 24 Hour Vital Signs








  Date Time  Temp Pulse Resp B/P (MAP) Pulse Ox O2 Delivery O2 Flow Rate FiO2


 


8/6/19 11:59  101 20  98 Nasal Cannula 2.0 28


 


8/6/19 11:50  90 20  97 Nasal Cannula 2.0 28


 


8/6/19 08:00 98.4 96 16 122/76 (91) 96   





  96      


 


8/6/19 07:43  89 22  99 Nasal Cannula 2.0 28


 


8/6/19 07:42  93 24  99 Nasal Cannula 2.0 28


 


8/6/19 07:34  94 20  99 Nasal Cannula 2.0 28


 


8/6/19 04:43  70 18  97   


 


8/6/19 04:00 98.4 97 20 125/90 (102) 95   


 


8/6/19 03:13      Nasal Cannula 2.0 28


 


8/6/19 03:12      Nasal Cannula 2.0 28


 


8/6/19 00:00 98.4 79 17 125/76 (92) 96   


 


8/5/19 23:21  75 20  100 Nasal Cannula 2.0 28


 


8/5/19 23:14  75 16  99 Nasal Cannula 2.0 28


 


8/5/19 21:00      Nasal Cannula 2.0 


 


8/5/19 20:00 98.3 77 18 128/74 (92) 96   


 


8/5/19 19:51  78 20  100 Nasal Cannula 2.0 28


 


8/5/19 19:51  78 20  99 Nasal Cannula 2.0 28


 


8/5/19 19:45  80 16  99 Nasal Cannula 2.0 28


 


8/5/19 16:00 98.5 94 18 140/74 (96) 98   


 


8/5/19 15:43  102 24  100 Nasal Cannula 2.0 28


 


8/5/19 15:33  101 24  98 Nasal Cannula 2.0 28








Height (Feet):  5


Height (Inches):  5.00


Weight (Pounds):  125


Objective


GENERAL:  Sleepy in bed, not responding to questions.


CARDIOVASCULAR:  No murmur.


LUNGS:  Poor air exchange.


ABDOMEN:  Bowel sounds distant.


EXTREMITIES:  No cyanosis or edema.


NEUROLOGIC:  The patient is flaccid in bed, does not want to follow


directions.





Laboratory Tests








Test


  8/6/19


05:15


 


White Blood Count


  25.6 K/UL


(4.8-10.8)  *H


 


Red Blood Count


  4.28 M/UL


(4.20-5.40)


 


Hemoglobin


  13.2 G/DL


(12.0-16.0)


 


Hematocrit


  38.7 %


(37.0-47.0)


 


Mean Corpuscular Volume 90 FL (80-99)  


 


Mean Corpuscular Hemoglobin


  30.8 PG


(27.0-31.0)


 


Mean Corpuscular Hemoglobin


Concent 34.1 G/DL


(32.0-36.0)


 


Red Cell Distribution Width


  12.6 %


(11.6-14.8)


 


Platelet Count


  292 K/UL


(150-450)


 


Mean Platelet Volume


  5.6 FL


(6.5-10.1)  L


 


Neutrophils (%) (Auto)


  % (45.0-75.0)


 


 


Lymphocytes (%) (Auto)


  % (20.0-45.0)


 


 


Monocytes (%) (Auto)  % (1.0-10.0)  


 


Eosinophils (%) (Auto)  % (0.0-3.0)  


 


Basophils (%) (Auto)  % (0.0-2.0)  


 


Differential Total Cells


Counted 100  


 


 


Neutrophils % (Manual) 94 % (45-75)  H


 


Lymphocytes % (Manual) 4 % (20-45)  L


 


Monocytes % (Manual) 2 % (1-10)  


 


Eosinophils % (Manual) 0 % (0-3)  


 


Basophils % (Manual) 0 % (0-2)  


 


Band Neutrophils 0 % (0-8)  


 


Platelet Estimate Adequate  


 


Platelet Morphology Normal  


 


Red Blood Cell Morphology Normal  


 


Sodium Level


  139 MMOL/L


(136-145)


 


Potassium Level


  3.8 MMOL/L


(3.5-5.1)


 


Chloride Level


  106 MMOL/L


()


 


Carbon Dioxide Level


  27 MMOL/L


(21-32)


 


Anion Gap


  6 mmol/L


(5-15)


 


Blood Urea Nitrogen


  18 mg/dL


(7-18)


 


Creatinine


  0.6 MG/DL


(0.55-1.30)


 


Estimat Glomerular Filtration


Rate > 60 mL/min


(>60)


 


Glucose Level


  142 MG/DL


()  H


 


Calcium Level


  8.6 MG/DL


(8.5-10.1)











Current Medications








 Medications


  (Trade)  Dose


 Ordered  Sig/Alistair


 Route


 PRN Reason  Start Time


 Stop Time Status Last Admin


Dose Admin


 


 Albuterol/


 Ipratropium


  (Albuterol/


 Ipratropium)  3 ml  Q4H  PRN


 HHN


 Shortness of Breath  8/5/19 05:30


 8/9/19 17:29   


 


 


 Albuterol/


 Ipratropium


  (Albuterol/


 Ipratropium)  3 ml  Q4HRT


 HHN


   8/5/19 07:00


 8/9/19 10:59  8/6/19 11:58


 


 


 Dextrose


  (Dextrose 50%)    STAT  PRN


 IV


 Hypoglycemia  8/5/19 03:30


 9/3/19 03:29   


 


 


 Dextrose/Sodium


 Chloride  1,000 ml @ 


 50 mls/hr  Q20H


 IV


   8/5/19 03:30


 9/3/19 17:59  8/5/19 14:03


 


 


 Insulin Aspart


  (NovoLOG)    BEFORE MEALS AND  HS


 SUBQ


   8/5/19 06:30


 9/3/19 20:59  8/6/19 11:53


 


 


 Levofloxacin


  (Levaquin)  500 mg  DAILY


 ORAL


   8/5/19 09:00


 8/12/19 08:59  8/6/19 08:44


 


 


 Lorazepam


  (Ativan 2mg/ml


 1ml)  2 mg  Q4H  PRN


 IV


 For Anxiety  8/5/19 03:30


 8/11/19 03:29   


 


 


 Methylprednisolone


 Sodium Succinate


  (Solu-MEDROL)  60 mg  EVERY 6  HOURS


 IV


   8/5/19 06:00


 9/3/19 17:59  8/6/19 11:53


 


 


 Morphine Sulfate


  (Morphine


 Sulfate)  2 mg  Q4H  PRN


 IVP


 Moderate Pain (Pain Scale 4-6)  8/5/19 03:30


 8/11/19 03:29   


 


 


 Morphine Sulfate


  (Morphine


 Sulfate)  4 mg  Q4H  PRN


 IVP


 Severe Pain (Pain Scale 7-10)  8/5/19 03:30


 8/11/19 03:29   


 


 


 Nitroglycerin


  (Ntg)  0.4 mg  Q5M X 3 DOSES PRN


 SL


 Prn Chest Pain  8/5/19 03:30


 9/3/19 17:29   


 


 


 Ondansetron HCl


  (Zofran)  4 mg  Q6H  PRN


 IVP


 Nausea & Vomiting  8/5/19 03:30


 9/3/19 03:29   


 


 


 Pantoprazole


  (Protonix)  40 mg  DAILY


 IV


   8/6/19 09:00


 9/5/19 08:59  8/6/19 08:44


 


 


 Promethazine HCl/


 Codeine


  (Phenergan with


 Codeine)  5 ml  Q6H  PRN


 ORAL


 For Cough  8/5/19 11:00


 9/4/19 10:59  8/6/19 11:02


 

















Leida Fernando M.D. Aug 6, 2019 13:31

## 2019-08-06 NOTE — NUR
NURSE NOTES:

Received report from NICK Junior. Patient is AAO x 4, receiving breathing treatment. 
Excessive productive coughing mentioned during the report noted. No other distress or 
discomfort noted at this time. Denies pain at this time. IV site on left AC noted intact 
running fluid as ordered. Bed is placed at the lowest with brake and side rails up x 2 for 
safety measures. Call light placed within reach and encouraged patient to call whenever 
assistance is needed. Will continue to monitor and provide care as ordered. 

-------------------------------------------------------------------------------

Addendum: 08/06/19 at 2118 by Shailesh Harmon RN

-------------------------------------------------------------------------------

IV on right AC, not left.

## 2019-08-07 VITALS — SYSTOLIC BLOOD PRESSURE: 130 MMHG | DIASTOLIC BLOOD PRESSURE: 77 MMHG

## 2019-08-07 VITALS — SYSTOLIC BLOOD PRESSURE: 136 MMHG | DIASTOLIC BLOOD PRESSURE: 70 MMHG

## 2019-08-07 VITALS — SYSTOLIC BLOOD PRESSURE: 134 MMHG | DIASTOLIC BLOOD PRESSURE: 70 MMHG

## 2019-08-07 VITALS — DIASTOLIC BLOOD PRESSURE: 79 MMHG | SYSTOLIC BLOOD PRESSURE: 136 MMHG

## 2019-08-07 VITALS — SYSTOLIC BLOOD PRESSURE: 147 MMHG | DIASTOLIC BLOOD PRESSURE: 84 MMHG

## 2019-08-07 LAB
ADD MANUAL DIFF: YES
ANION GAP SERPL CALC-SCNC: 7 MMOL/L (ref 5–15)
BUN SERPL-MCNC: 15 MG/DL (ref 7–18)
CALCIUM SERPL-MCNC: 8.5 MG/DL (ref 8.5–10.1)
CHLORIDE SERPL-SCNC: 104 MMOL/L (ref 98–107)
CO2 SERPL-SCNC: 29 MMOL/L (ref 21–32)
CREAT SERPL-MCNC: 0.5 MG/DL (ref 0.55–1.3)
ERYTHROCYTE [DISTWIDTH] IN BLOOD BY AUTOMATED COUNT: 11.8 % (ref 11.6–14.8)
HCT VFR BLD CALC: 39.4 % (ref 37–47)
HGB BLD-MCNC: 13.3 G/DL (ref 12–16)
MCV RBC AUTO: 91 FL (ref 80–99)
PLATELET # BLD: 282 K/UL (ref 150–450)
POTASSIUM SERPL-SCNC: 3.2 MMOL/L (ref 3.5–5.1)
RBC # BLD AUTO: 4.33 M/UL (ref 4.2–5.4)
SODIUM SERPL-SCNC: 140 MMOL/L (ref 136–145)
WBC # BLD AUTO: 19.3 K/UL (ref 4.8–10.8)

## 2019-08-07 RX ADMIN — METHYLPREDNISOLONE SODIUM SUCCINATE SCH MG: 125 INJECTION, POWDER, FOR SOLUTION INTRAMUSCULAR; INTRAVENOUS at 05:59

## 2019-08-07 RX ADMIN — IPRATROPIUM BROMIDE AND ALBUTEROL SULFATE SCH ML: .5; 3 SOLUTION RESPIRATORY (INHALATION) at 15:49

## 2019-08-07 RX ADMIN — INSULIN ASPART SCH UNITS: 100 INJECTION, SOLUTION INTRAVENOUS; SUBCUTANEOUS at 17:07

## 2019-08-07 RX ADMIN — DEXTROSE AND SODIUM CHLORIDE SCH MLS/HR: 5; .45 INJECTION, SOLUTION INTRAVENOUS at 15:30

## 2019-08-07 RX ADMIN — PROMETHAZINE HYDROCHLORIDE PRN ML: 6.25 SOLUTION ORAL at 09:25

## 2019-08-07 RX ADMIN — METHYLPREDNISOLONE SODIUM SUCCINATE SCH MG: 125 INJECTION, POWDER, FOR SOLUTION INTRAMUSCULAR; INTRAVENOUS at 13:03

## 2019-08-07 RX ADMIN — PROMETHAZINE HYDROCHLORIDE PRN ML: 6.25 SOLUTION ORAL at 01:51

## 2019-08-07 RX ADMIN — IPRATROPIUM BROMIDE AND ALBUTEROL SULFATE SCH ML: .5; 3 SOLUTION RESPIRATORY (INHALATION) at 03:41

## 2019-08-07 RX ADMIN — METHYLPREDNISOLONE SODIUM SUCCINATE SCH MG: 125 INJECTION, POWDER, FOR SOLUTION INTRAMUSCULAR; INTRAVENOUS at 17:06

## 2019-08-07 RX ADMIN — INSULIN ASPART SCH UNITS: 100 INJECTION, SOLUTION INTRAVENOUS; SUBCUTANEOUS at 13:04

## 2019-08-07 RX ADMIN — INSULIN ASPART SCH UNITS: 100 INJECTION, SOLUTION INTRAVENOUS; SUBCUTANEOUS at 06:00

## 2019-08-07 RX ADMIN — IPRATROPIUM BROMIDE AND ALBUTEROL SULFATE SCH ML: .5; 3 SOLUTION RESPIRATORY (INHALATION) at 07:35

## 2019-08-07 RX ADMIN — LEVOFLOXACIN SCH MG: 500 TABLET, FILM COATED ORAL at 09:25

## 2019-08-07 RX ADMIN — PANTOPRAZOLE SODIUM SCH MG: 40 INJECTION, POWDER, FOR SOLUTION INTRAVENOUS at 09:25

## 2019-08-07 RX ADMIN — IPRATROPIUM BROMIDE AND ALBUTEROL SULFATE SCH ML: .5; 3 SOLUTION RESPIRATORY (INHALATION) at 11:01

## 2019-08-07 NOTE — NUR
NURSE NOTES:

Received order to d/c home today. discharge instructions and belongings list given to 
patient. IV d/c'd prior to d/c home. also given Rx. pt stated that she will go to her 
regular pharmacy. pt's friend will provide transportation. pt left the floor with no signs 
of distress or other issues at this time.

## 2019-08-07 NOTE — CARDIOLOGY REPORT
--------------- APPROVED REPORT --------------





EKG Measurement

Heart [id number]

ID [id number]

[id number]

[id number]

[id number]





Sinus rhythm with premature atrial complexes

Nonspecific ST abnormality

Prolonged QT

Abnormal ECG

## 2019-08-07 NOTE — GENERAL PROGRESS NOTE
Assessment/Plan


Problem List:  


(1) Respiratory distress


ICD Codes:  R06.03 - Acute respiratory distress


SNOMED:  820920405


(2) COPD with exacerbation


ICD Codes:  J44.1 - Chronic obstructive pulmonary disease with (acute) 

exacerbation


SNOMED:  173100169


(3) Infection


ICD Codes:  B99.9 - Unspecified infectious disease


SNOMED:  52617791


Status:  stable, progressing


Assessment/Plan:


o2 pulm tx abx cbc bmp am dc plan





Subjective


Constitutional:  Reports: weakness


Allergies:  


Coded Allergies:  


     No Known Allergies (Unverified , 8/4/19)


All Systems:  reviewed and negative except above


Subjective


o2nc sl anxious





Objective





Last 24 Hour Vital Signs








  Date Time  Temp Pulse Resp B/P (MAP) Pulse Ox O2 Delivery O2 Flow Rate FiO2


 


8/7/19 11:11  91 18  99 Nasal Cannula 2.0 28


 


8/7/19 11:01  80 18  98 Nasal Cannula 2.0 28


 


8/7/19 09:00      Nasal Cannula 2.0 


 


8/7/19 08:00 98.8 91 17 134/70 (91) 97   





  96      


 


8/7/19 07:46  81 18  98 Nasal Cannula 2.0 28


 


8/7/19 07:35  83 18  98 Nasal Cannula 2.0 28


 


8/7/19 07:35  83 18  98 Nasal Cannula 2.0 28


 


8/7/19 04:00 98.5 83 17 136/70 (92) 97   





  83      


 


8/7/19 03:51  76 18  99 Nasal Cannula 2.0 28


 


8/7/19 03:41  73 18  98 Nasal Cannula 2.0 28


 


8/7/19 00:00 98.4 77 18 136/79 (98) 98   





  77      


 


8/6/19 23:23  73 18  99 Nasal Cannula 2.0 28


 


8/6/19 23:13  71 18  99 Nasal Cannula 2.0 28


 


8/6/19 21:00      Nasal Cannula 2.0 


 


8/6/19 20:00 98.6 98 17 144/77 (99) 96   





  98      


 


8/6/19 19:38  97 20  98 Nasal Cannula 2.0 28


 


8/6/19 19:28  101 22  99 Nasal Cannula 3.0 32


 


8/6/19 19:28  101 22  99 Nasal Cannula 3.0 32


 


8/6/19 16:00 98.2 78 16 128/71 (90) 99   





  78      


 


8/6/19 15:27  87 22  100 Nasal Cannula 2.0 28


 


8/6/19 15:20  82 22  96 Nasal Cannula 2.0 28

















Intake and Output  


 


 8/6/19 8/7/19





 19:00 07:00


 


Intake Total 1300 ml 840 ml


 


Output Total 200 ml 


 


Balance 1100 ml 840 ml


 


  


 


Intake Oral 1000 ml 240 ml


 


IV Total 300 ml 600 ml


 


Output Urine Total 200 ml 


 


# Voids  1








Laboratory Tests


8/7/19 05:40: 


White Blood Count 19.3H, Red Blood Count 4.33, Hemoglobin 13.3, Hematocrit 39.4

, Mean Corpuscular Volume 91, Mean Corpuscular Hemoglobin 30.7, Mean 

Corpuscular Hemoglobin Concent 33.8, Red Cell Distribution Width 11.8, Platelet 

Count 282, Mean Platelet Volume 5.7L, Neutrophils (%) (Auto) , Lymphocytes (%) (

Auto) , Monocytes (%) (Auto) , Eosinophils (%) (Auto) , Basophils (%) (Auto) , 

Differential Total Cells Counted 100, Neutrophils % (Manual) 94H, Lymphocytes % 

(Manual) 5L, Monocytes % (Manual) 1, Eosinophils % (Manual) 0, Basophils % (

Manual) 0, Band Neutrophils 0, Platelet Estimate Adequate, Platelet Morphology 

Normal, Red Blood Cell Morphology Normal, Sodium Level 140, Potassium Level 3.2L

, Chloride Level 104, Carbon Dioxide Level 29, Anion Gap 7, Blood Urea Nitrogen 

15, Creatinine 0.5L, Estimat Glomerular Filtration Rate > 60, Glucose Level 128H

, Calcium Level 8.5


Height (Feet):  5


Height (Inches):  5.00


Weight (Pounds):  140


General Appearance:  lethargic


EENT:  normal ENT inspection


Neck:  normal alignment


Cardiovascular:  normal peripheral pulses, normal rate, regular rhythm


Respiratory/Chest:  chest wall non-tender, lungs clear, normal breath sounds


Abdomen:  normal bowel sounds, non tender, soft


Extremities:  normal inspection


Edema:  no edema noted Arm (L), no edema noted Arm (R), no edema noted Leg (L), 

no edema noted Leg (R), no edema noted Pedal (L), no edema noted Pedal (R), no 

edema noted Generalized


Neurologic:  responsive, motor weakness


Skin:  normal pigmentation, warm/dry











Casper Carey DO Aug 7, 2019 14:23

## 2019-08-07 NOTE — NUR
NURSE NOTES:

Received report from Eden ROMERO, pt a/a/o x4 seating in bed, eating breakfast with no signs of 
distress or other issues at this time. pt on O2L via n/c. no IV access at this time due to 
IV infiltration, RN will place new IV. accuchecks BID. pt is in [facility] medium diet, patient in 
able to tolerated well with no n/v. call light within reach, bed in lowest position. side 
rales up x2. I will f/u as needed.

## 2019-08-07 NOTE — NUR
NURSE NOTES:

Previous IV site infiltrated. Attempted to start a new IV on patient. Patient agreed on new 
IV but was not able to tolerate the procedure at this time. Patient requested the IV to be 
inserted later during the day. Will endorse the request to daytime nurse.

## 2019-08-07 NOTE — PULMONOLOGY PROGRESS NOTE
Subjective


Allergies:  


Coded Allergies:  


     No Known Allergies (Unverified , 8/4/19)





Objective





Last 24 Hour Vital Signs








  Date Time  Temp Pulse Resp B/P (MAP) Pulse Ox O2 Delivery O2 Flow Rate FiO2


 


8/7/19 16:00 97.9 86 20 147/84 (105) 98   





  86      


 


8/7/19 15:59  89 18  99 Nasal Cannula 2.0 28


 


8/7/19 15:49  89 18  98 Nasal Cannula 2.0 28


 


8/7/19 12:00 98.6 90 17 130/77 (94) 98   





  90      


 


8/7/19 11:11  91 18  99 Nasal Cannula 2.0 28


 


8/7/19 11:01  80 18  98 Nasal Cannula 2.0 28


 


8/7/19 09:00      Nasal Cannula 2.0 


 


8/7/19 08:00 98.8 91 17 134/70 (91) 97   





  96      


 


8/7/19 07:46  81 18  98 Nasal Cannula 2.0 28


 


8/7/19 07:35  83 18  98 Nasal Cannula 2.0 28 8/7/19 07:35  83 18  98 Nasal Cannula 2.0 28


 


8/7/19 04:00 98.5 83 17 136/70 (92) 97   





  83      


 


8/7/19 03:51  76 18  99 Nasal Cannula 2.0 28


 


8/7/19 03:41  73 18  98 Nasal Cannula 2.0 28


 


8/7/19 00:00 98.4 77 18 136/79 (98) 98   





  77      


 


8/6/19 23:23  73 18  99 Nasal Cannula 2.0 28


 


8/6/19 23:13  71 18  99 Nasal Cannula 2.0 28


 


8/6/19 21:00      Nasal Cannula 2.0 


 


8/6/19 20:00 98.6 98 17 144/77 (99) 96   





  98      

















Intake and Output  


 


 8/6/19 8/7/19





 18:59 06:59


 


Intake Total 1250 ml 890 ml


 


Output Total 200 ml 


 


Balance 1050 ml 890 ml


 


  


 


Intake Oral 1000 ml 240 ml


 


IV Total 250 ml 650 ml


 


Output Urine Total 200 ml 


 


# Voids  1








Laboratory Tests


8/7/19 05:40: 


White Blood Count 19.3H, Red Blood Count 4.33, Hemoglobin 13.3, Hematocrit 39.4

, Mean Corpuscular Volume 91, Mean Corpuscular Hemoglobin 30.7, Mean 

Corpuscular Hemoglobin Concent 33.8, Red Cell Distribution Width 11.8, Platelet 

Count 282, Mean Platelet Volume 5.7L, Neutrophils (%) (Auto) , Lymphocytes (%) (

Auto) , Monocytes (%) (Auto) , Eosinophils (%) (Auto) , Basophils (%) (Auto) , 

Differential Total Cells Counted 100, Neutrophils % (Manual) 94H, Lymphocytes % 

(Manual) 5L, Monocytes % (Manual) 1, Eosinophils % (Manual) 0, Basophils % (

Manual) 0, Band Neutrophils 0, Platelet Estimate Adequate, Platelet Morphology 

Normal, Red Blood Cell Morphology Normal, Sodium Level 140, Potassium Level 3.2L

, Chloride Level 104, Carbon Dioxide Level 29, Anion Gap 7, Blood Urea Nitrogen 

15, Creatinine 0.5L, Estimat Glomerular Filtration Rate > 60, Glucose Level 128H

, Calcium Level 8.5





Current Medications








 Medications


  (Trade)  Dose


 Ordered  Sig/Alistair


 Route


 PRN Reason  Start Time


 Stop Time Status Last Admin


Dose Admin


 


 Albuterol/


 Ipratropium


  (Albuterol/


 Ipratropium)  3 ml  Q4H  PRN


 HHN


 Shortness of Breath  8/5/19 05:30


 8/9/19 17:29   


 


 


 Albuterol/


 Ipratropium


  (Albuterol/


 Ipratropium)  3 ml  Q4HRT


 HHN


   8/5/19 07:00


 8/9/19 10:59  8/7/19 15:49


 


 


 Dextrose


  (Dextrose 50%)    STAT  PRN


 IV


 Hypoglycemia  8/5/19 03:30


 9/3/19 03:29   


 


 


 Dextrose/Sodium


 Chloride  1,000 ml @ 


 50 mls/hr  Q20H


 IV


   8/5/19 03:30


 9/3/19 17:59  8/6/19 21:40


 


 


 Insulin Aspart


  (NovoLOG)    BEFORE MEALS AND  HS


 SUBQ


   8/5/19 06:30


 9/3/19 20:59  8/7/19 17:07


 


 


 Levofloxacin


  (Levaquin)  500 mg  DAILY


 ORAL


   8/5/19 09:00


 8/12/19 08:59  8/7/19 09:25


 


 


 Lorazepam


  (Ativan 2mg/ml


 1ml)  2 mg  Q4H  PRN


 IV


 For Anxiety  8/5/19 03:30


 8/11/19 03:29   


 


 


 Methylprednisolone


 Sodium Succinate


  (Solu-MEDROL)  60 mg  EVERY 6  HOURS


 IV


   8/5/19 06:00


 9/3/19 17:59  8/7/19 17:06


 


 


 Morphine Sulfate


  (Morphine


 Sulfate)  2 mg  Q4H  PRN


 IVP


 Moderate Pain (Pain Scale 4-6)  8/5/19 03:30


 8/11/19 03:29   


 


 


 Morphine Sulfate


  (Morphine


 Sulfate)  4 mg  Q4H  PRN


 IVP


 Severe Pain (Pain Scale 7-10)  8/5/19 03:30


 8/11/19 03:29   


 


 


 Nitroglycerin


  (Ntg)  0.4 mg  Q5M X 3 DOSES PRN


 SL


 Prn Chest Pain  8/5/19 03:30


 9/3/19 17:29   


 


 


 Ondansetron HCl


  (Zofran)  4 mg  Q6H  PRN


 IVP


 Nausea & Vomiting  8/5/19 03:30


 9/3/19 03:29   


 


 


 Pantoprazole


  (Protonix)  40 mg  DAILY


 IV


   8/6/19 09:00


 9/5/19 08:59  8/7/19 09:25


 


 


 Promethazine HCl/


 Codeine


  (Phenergan with


 Codeine)  5 ml  Q6H  PRN


 ORAL


 For Cough  8/5/19 11:00


 9/4/19 10:59  8/7/19 09:25


 

















Star Monte MD Aug 7, 2019 19:55

## 2019-08-08 NOTE — DISCHARGE SUMMARY
Discharge Summary


Discharge Summary


_


Who came DATE OF ADMISSION: 8/4/2019





DATE OF DISCHARGE 8/7/2019





DISCHARGED BY: Dr Carey





REASON FOR ADMISSION: 


49 years old female with past medical history of asthma, COPD, smoking, 

presented with acute shortness of breath, which started just prior to arrival 

by paramedics.  


Patient was unable to speak full sentences and was using accessory muscles.  


Patient was taking albuterol inhaler at home without significant relief.


Laboratory work-up revealed leukocytosis WBC 15.2.


ABG demonstrated acidosis with   pH of 7.26, no hypercapnia.   


Troponin was negative.  EKG revealed normal sinus rhythm.


 


Stable electrolytes, renal parameters and LFT.


Glucose 147.


Urinalysis revealed +4 protein , +3 ketones,   no evidence of UTI.  


Chest x-ray demonstrated no acute cardiopulmonary pathology.


In emergency department  patient started on BiPAP.


Patient received IV steroids,  started on the IV fluids. 


Handheld nebulizing treatment with bronchodilator provided.  


Patient started on ceftriaxone and azithromycin for possible pneumonia.  


Reevaluation showed slow improvement with  BiPAP.


Patient subsequently admitted  for further management.  











CONSULTANTS:


pulmonary Dr. Lavell RUBI specialist Dr. Fernando


 


 


[facility] COURSE: 


Patient admitted.


Supplemental oxygen provided to keep pulse oximetry above 90%.  


Patient initially was on the BiPAP.


Pulmonary toilet provided  with nebulizing therapy with bronchodilator  around 

the clock and as needed.


Patient was started on intravenous steroids with gradual tapering down. 


Blood culture were negative..


Patient was  on empiric antibiotic.


Repeated chest x-ray revealed no evidence of cardiopulmonary pathology...


Antitussive provided as needed


Patient was counseled on smoking cessation 


Patient declined nicotine patch


Blood sugar was managed with sliding scale of insulin.


GI prophylaxis provided.





Patient was able to be weaned from the BiPAP to nasal cannula. 


Leukocytosis initially  was trending up and then started to trend down , likely 

due to steroids.  


Steroids rapidly tapered and changed to oral Medrol Dosepak upon discharge.


DVT prophylaxis provided.  


Patient clinically stabilized and was ready for discharge home on Medrol 

Dosepak and Bactrim for 5 days.


Patient have an inhaler at home.


Patient was advised to follow-up with her primary care provider and 

pulmonologist.








FINAL DIAGNOSES: 


COPD exacerbation


Respiratory distress


Smoker


Leukocytosis


Diabetes mellitus.





DISCHARGE MEDICATIONS:


See Medication Reconciliation list.





DISCHARGE INSTRUCTIONS:


Patient was discharged home .


Follow up with primary care provider in one week.





I have been assigned to dictate discharge summary for this account. 


I was not involved in the patient's management.











Mily Knott NP Aug 8, 2019 10:38